# Patient Record
Sex: FEMALE | Race: WHITE | NOT HISPANIC OR LATINO | Employment: UNEMPLOYED | URBAN - METROPOLITAN AREA
[De-identification: names, ages, dates, MRNs, and addresses within clinical notes are randomized per-mention and may not be internally consistent; named-entity substitution may affect disease eponyms.]

---

## 2017-01-01 ENCOUNTER — HOSPITAL ENCOUNTER (OUTPATIENT)
Facility: HOSPITAL | Age: 77
End: 2017-06-24
Attending: EMERGENCY MEDICINE | Admitting: FAMILY MEDICINE
Payer: MEDICARE

## 2017-01-01 ENCOUNTER — HOSPITAL ENCOUNTER (INPATIENT)
Facility: HOSPITAL | Age: 77
LOS: 6 days | DRG: 189 | End: 2017-08-09
Attending: EMERGENCY MEDICINE | Admitting: INTERNAL MEDICINE
Payer: MEDICARE

## 2017-01-01 ENCOUNTER — HOSPITAL ENCOUNTER (INPATIENT)
Facility: HOSPITAL | Age: 77
LOS: 6 days | Discharge: HOSPICE | DRG: 189 | End: 2017-08-01
Attending: EMERGENCY MEDICINE | Admitting: INTERNAL MEDICINE
Payer: MEDICARE

## 2017-01-01 ENCOUNTER — HOSPITAL ENCOUNTER (EMERGENCY)
Facility: HOSPITAL | Age: 77
Discharge: PSYCHIATRIC OR SUBSTANCE USE FACILITY | End: 2017-05-29
Attending: EMERGENCY MEDICINE
Payer: MEDICARE

## 2017-01-01 VITALS
SYSTOLIC BLOOD PRESSURE: 151 MMHG | TEMPERATURE: 99 F | HEIGHT: 67 IN | DIASTOLIC BLOOD PRESSURE: 67 MMHG | WEIGHT: 170 LBS | RESPIRATION RATE: 18 BRPM | BODY MASS INDEX: 26.68 KG/M2 | OXYGEN SATURATION: 96 % | HEART RATE: 90 BPM

## 2017-01-01 VITALS
TEMPERATURE: 98 F | HEART RATE: 99 BPM | BODY MASS INDEX: 28.19 KG/M2 | HEIGHT: 64 IN | RESPIRATION RATE: 20 BRPM | DIASTOLIC BLOOD PRESSURE: 65 MMHG | OXYGEN SATURATION: 95 % | WEIGHT: 165.13 LBS | SYSTOLIC BLOOD PRESSURE: 147 MMHG

## 2017-01-01 VITALS
HEART RATE: 74 BPM | DIASTOLIC BLOOD PRESSURE: 60 MMHG | RESPIRATION RATE: 17 BRPM | OXYGEN SATURATION: 98 % | TEMPERATURE: 98 F | SYSTOLIC BLOOD PRESSURE: 127 MMHG

## 2017-01-01 VITALS
WEIGHT: 145.31 LBS | HEART RATE: 45 BPM | DIASTOLIC BLOOD PRESSURE: 22 MMHG | TEMPERATURE: 94 F | OXYGEN SATURATION: 65 % | HEIGHT: 64 IN | RESPIRATION RATE: 10 BRPM | BODY MASS INDEX: 24.81 KG/M2 | SYSTOLIC BLOOD PRESSURE: 56 MMHG

## 2017-01-01 DIAGNOSIS — F05 ACUTE CONFUSIONAL STATE: ICD-10-CM

## 2017-01-01 DIAGNOSIS — J96.02 ACUTE RESPIRATORY FAILURE WITH HYPOXIA AND HYPERCARBIA: ICD-10-CM

## 2017-01-01 DIAGNOSIS — I50.9 ACUTE ON CHRONIC CONGESTIVE HEART FAILURE, UNSPECIFIED CONGESTIVE HEART FAILURE TYPE: ICD-10-CM

## 2017-01-01 DIAGNOSIS — D61.818 PANCYTOPENIA: Primary | ICD-10-CM

## 2017-01-01 DIAGNOSIS — E03.9 HYPOTHYROIDISM, UNSPECIFIED TYPE: ICD-10-CM

## 2017-01-01 DIAGNOSIS — I50.1 PULMONARY EDEMA CARDIAC CAUSE: ICD-10-CM

## 2017-01-01 DIAGNOSIS — D64.9 SYMPTOMATIC ANEMIA: ICD-10-CM

## 2017-01-01 DIAGNOSIS — I50.31 ACUTE DIASTOLIC HEART FAILURE: ICD-10-CM

## 2017-01-01 DIAGNOSIS — J18.9 PNEUMONIA OF BOTH LUNGS DUE TO INFECTIOUS ORGANISM, UNSPECIFIED PART OF LUNG: Primary | ICD-10-CM

## 2017-01-01 DIAGNOSIS — J95.84 TRALI (TRANSFUSION RELATED ACUTE LUNG INJURY): ICD-10-CM

## 2017-01-01 DIAGNOSIS — R06.02 SOB (SHORTNESS OF BREATH): ICD-10-CM

## 2017-01-01 DIAGNOSIS — I50.9 CHF (CONGESTIVE HEART FAILURE): ICD-10-CM

## 2017-01-01 DIAGNOSIS — F03.90 DEMENTIA WITHOUT BEHAVIORAL DISTURBANCE, UNSPECIFIED DEMENTIA TYPE: ICD-10-CM

## 2017-01-01 DIAGNOSIS — N39.0 URINARY TRACT INFECTION WITHOUT HEMATURIA, SITE UNSPECIFIED: ICD-10-CM

## 2017-01-01 DIAGNOSIS — R41.0 CONFUSION: ICD-10-CM

## 2017-01-01 DIAGNOSIS — R45.1 AGITATION: Primary | ICD-10-CM

## 2017-01-01 DIAGNOSIS — J96.01 ACUTE RESPIRATORY FAILURE WITH HYPOXIA AND HYPERCARBIA: ICD-10-CM

## 2017-01-01 DIAGNOSIS — R50.9 FEVER: ICD-10-CM

## 2017-01-01 DIAGNOSIS — J81.0 ACUTE PULMONARY EDEMA: ICD-10-CM

## 2017-01-01 DIAGNOSIS — J69.0 ASPIRATION PNEUMONIA OF BOTH LOWER LOBES, UNSPECIFIED ASPIRATION PNEUMONIA TYPE: ICD-10-CM

## 2017-01-01 DIAGNOSIS — R09.02 HYPOXIA: Primary | ICD-10-CM

## 2017-01-01 LAB
ABO + RH BLD: NORMAL
ALBUMIN SERPL BCP-MCNC: 2 G/DL
ALBUMIN SERPL BCP-MCNC: 2 G/DL
ALBUMIN SERPL BCP-MCNC: 2.1 G/DL
ALBUMIN SERPL BCP-MCNC: 2.1 G/DL
ALBUMIN SERPL BCP-MCNC: 2.2 G/DL
ALBUMIN SERPL BCP-MCNC: 2.4 G/DL
ALBUMIN SERPL BCP-MCNC: 2.8 G/DL
ALLENS TEST: ABNORMAL
ALP SERPL-CCNC: 107 U/L
ALP SERPL-CCNC: 108 U/L
ALP SERPL-CCNC: 124 U/L
ALP SERPL-CCNC: 124 U/L
ALP SERPL-CCNC: 94 U/L
ALP SERPL-CCNC: 98 U/L
ALP SERPL-CCNC: 98 U/L
ALT SERPL W/O P-5'-P-CCNC: 18 U/L
ALT SERPL W/O P-5'-P-CCNC: 20 U/L
ALT SERPL W/O P-5'-P-CCNC: 21 U/L
ALT SERPL W/O P-5'-P-CCNC: 25 U/L
ALT SERPL W/O P-5'-P-CCNC: 26 U/L
ALT SERPL W/O P-5'-P-CCNC: 28 U/L
ALT SERPL W/O P-5'-P-CCNC: 34 U/L
AMPHET+METHAMPHET UR QL: NEGATIVE
ANION GAP SERPL CALC-SCNC: 10 MMOL/L
ANION GAP SERPL CALC-SCNC: 11 MMOL/L
ANION GAP SERPL CALC-SCNC: 12 MMOL/L
ANION GAP SERPL CALC-SCNC: 6 MMOL/L
ANION GAP SERPL CALC-SCNC: 7 MMOL/L
ANION GAP SERPL CALC-SCNC: 9 MMOL/L
ANION GAP SERPL CALC-SCNC: 9 MMOL/L
APAP SERPL-MCNC: <3 UG/ML
AST SERPL-CCNC: 36 U/L
AST SERPL-CCNC: 39 U/L
AST SERPL-CCNC: 41 U/L
AST SERPL-CCNC: 47 U/L
AST SERPL-CCNC: 58 U/L
AST SERPL-CCNC: 60 U/L
AST SERPL-CCNC: 73 U/L
BACTERIA #/AREA URNS HPF: ABNORMAL /HPF
BACTERIA BLD CULT: NORMAL
BACTERIA BLD CULT: NORMAL
BACTERIA UR CULT: NO GROWTH
BACTERIA UR CULT: NORMAL
BARBITURATES UR QL SCN>200 NG/ML: NEGATIVE
BASOPHILS # BLD AUTO: 0 K/UL
BASOPHILS # BLD AUTO: 0.1 K/UL
BASOPHILS # BLD AUTO: 0.3 K/UL
BASOPHILS NFR BLD: 0 %
BASOPHILS NFR BLD: 0.2 %
BASOPHILS NFR BLD: 0.3 %
BASOPHILS NFR BLD: 0.6 %
BASOPHILS NFR BLD: 1 %
BASOPHILS NFR BLD: 1.9 %
BENZODIAZ UR QL SCN>200 NG/ML: NORMAL
BILIRUB SERPL-MCNC: 0.6 MG/DL
BILIRUB SERPL-MCNC: 0.8 MG/DL
BILIRUB SERPL-MCNC: 0.9 MG/DL
BILIRUB SERPL-MCNC: 1 MG/DL
BILIRUB SERPL-MCNC: 1 MG/DL
BILIRUB UR QL STRIP: NEGATIVE
BLD GP AB SCN CELLS X3 SERPL QL: NORMAL
BLD PROD TYP BPU: NORMAL
BLOOD UNIT EXPIRATION DATE: NORMAL
BLOOD UNIT TYPE CODE: 9500
BLOOD UNIT TYPE: NORMAL
BNP SERPL-MCNC: 1039 PG/ML
BNP SERPL-MCNC: 1275 PG/ML
BNP SERPL-MCNC: 1782 PG/ML
BUN SERPL-MCNC: 14 MG/DL
BUN SERPL-MCNC: 14 MG/DL
BUN SERPL-MCNC: 15 MG/DL
BUN SERPL-MCNC: 17 MG/DL
BUN SERPL-MCNC: 18 MG/DL
BUN SERPL-MCNC: 18 MG/DL
BUN SERPL-MCNC: 20 MG/DL
BUN SERPL-MCNC: 22 MG/DL
BUN SERPL-MCNC: 23 MG/DL
BUN SERPL-MCNC: 23 MG/DL
BUN SERPL-MCNC: 24 MG/DL
BZE UR QL SCN: NEGATIVE
CALCIUM SERPL-MCNC: 8.1 MG/DL
CALCIUM SERPL-MCNC: 8.5 MG/DL
CALCIUM SERPL-MCNC: 8.5 MG/DL
CALCIUM SERPL-MCNC: 8.6 MG/DL
CALCIUM SERPL-MCNC: 8.8 MG/DL
CALCIUM SERPL-MCNC: 8.9 MG/DL
CALCIUM SERPL-MCNC: 9 MG/DL
CALCIUM SERPL-MCNC: 9 MG/DL
CALCIUM SERPL-MCNC: 9.2 MG/DL
CALCIUM SERPL-MCNC: 9.3 MG/DL
CALCIUM SERPL-MCNC: 9.3 MG/DL
CALCIUM SERPL-MCNC: 9.4 MG/DL
CANNABINOIDS UR QL SCN: NEGATIVE
CHLORIDE SERPL-SCNC: 103 MMOL/L
CHLORIDE SERPL-SCNC: 105 MMOL/L
CHLORIDE SERPL-SCNC: 107 MMOL/L
CHLORIDE SERPL-SCNC: 109 MMOL/L
CHLORIDE SERPL-SCNC: 90 MMOL/L
CHLORIDE SERPL-SCNC: 92 MMOL/L
CHLORIDE SERPL-SCNC: 93 MMOL/L
CHLORIDE SERPL-SCNC: 95 MMOL/L
CHLORIDE SERPL-SCNC: 98 MMOL/L
CHLORIDE SERPL-SCNC: 99 MMOL/L
CK MB SERPL-MCNC: 1.3 NG/ML
CK MB SERPL-MCNC: 1.9 NG/ML
CK MB SERPL-MCNC: 2.5 NG/ML
CK MB SERPL-RTO: 2.7 %
CK MB SERPL-RTO: 3 %
CK MB SERPL-RTO: 3.1 %
CK SERPL-CCNC: 44 U/L
CK SERPL-CCNC: 61 U/L
CK SERPL-CCNC: 93 U/L
CLARITY UR: CLEAR
CO2 SERPL-SCNC: 24 MMOL/L
CO2 SERPL-SCNC: 27 MMOL/L
CO2 SERPL-SCNC: 31 MMOL/L
CO2 SERPL-SCNC: 31 MMOL/L
CO2 SERPL-SCNC: 34 MMOL/L
CO2 SERPL-SCNC: 36 MMOL/L
CO2 SERPL-SCNC: 37 MMOL/L
CO2 SERPL-SCNC: 39 MMOL/L
CO2 SERPL-SCNC: 39 MMOL/L
CO2 SERPL-SCNC: 40 MMOL/L
CO2 SERPL-SCNC: 40 MMOL/L
CO2 SERPL-SCNC: 41 MMOL/L
CODING SYSTEM: NORMAL
COLOR UR: YELLOW
CREAT SERPL-MCNC: 0.8 MG/DL
CREAT SERPL-MCNC: 0.9 MG/DL
CREAT SERPL-MCNC: 1 MG/DL
CREAT SERPL-MCNC: 1.1 MG/DL
CREAT UR-MCNC: 211.1 MG/DL
DIASTOLIC DYSFUNCTION: NO
DIFFERENTIAL METHOD: ABNORMAL
DISPENSE STATUS: NORMAL
EOSINOPHIL # BLD AUTO: 0 K/UL
EOSINOPHIL # BLD AUTO: 0.1 K/UL
EOSINOPHIL # BLD AUTO: 0.2 K/UL
EOSINOPHIL # BLD AUTO: 0.2 K/UL
EOSINOPHIL NFR BLD: 0 %
EOSINOPHIL NFR BLD: 0.1 %
EOSINOPHIL NFR BLD: 0.1 %
EOSINOPHIL NFR BLD: 0.8 %
EOSINOPHIL NFR BLD: 1 %
EOSINOPHIL NFR BLD: 1 %
EOSINOPHIL NFR BLD: 1.5 %
EOSINOPHIL NFR BLD: 1.7 %
EOSINOPHIL NFR BLD: 3 %
EOSINOPHIL NFR BLD: 3.6 %
EOSINOPHIL NFR BLD: 4 %
ERYTHROCYTE [DISTWIDTH] IN BLOOD BY AUTOMATED COUNT: 16.5 %
ERYTHROCYTE [DISTWIDTH] IN BLOOD BY AUTOMATED COUNT: 17.8 %
ERYTHROCYTE [DISTWIDTH] IN BLOOD BY AUTOMATED COUNT: 18 %
ERYTHROCYTE [DISTWIDTH] IN BLOOD BY AUTOMATED COUNT: 18 %
ERYTHROCYTE [DISTWIDTH] IN BLOOD BY AUTOMATED COUNT: 18.2 %
ERYTHROCYTE [DISTWIDTH] IN BLOOD BY AUTOMATED COUNT: 18.3 %
ERYTHROCYTE [DISTWIDTH] IN BLOOD BY AUTOMATED COUNT: 18.5 %
ERYTHROCYTE [DISTWIDTH] IN BLOOD BY AUTOMATED COUNT: 18.6 %
ERYTHROCYTE [DISTWIDTH] IN BLOOD BY AUTOMATED COUNT: 19 %
ERYTHROCYTE [DISTWIDTH] IN BLOOD BY AUTOMATED COUNT: 19 %
ERYTHROCYTE [DISTWIDTH] IN BLOOD BY AUTOMATED COUNT: 19.6 %
ERYTHROCYTE [DISTWIDTH] IN BLOOD BY AUTOMATED COUNT: 19.9 %
ERYTHROCYTE [DISTWIDTH] IN BLOOD BY AUTOMATED COUNT: 19.9 %
ERYTHROCYTE [DISTWIDTH] IN BLOOD BY AUTOMATED COUNT: 21.4 %
EST. GFR  (AFRICAN AMERICAN): 56 ML/MIN/1.73 M^2
EST. GFR  (AFRICAN AMERICAN): >60 ML/MIN/1.73 M^2
EST. GFR  (NON AFRICAN AMERICAN): 49 ML/MIN/1.73 M^2
EST. GFR  (NON AFRICAN AMERICAN): 55 ML/MIN/1.73 M^2
EST. GFR  (NON AFRICAN AMERICAN): >60 ML/MIN/1.73 M^2
ESTIMATED AVG GLUCOSE: 103 MG/DL
ESTIMATED PA SYSTOLIC PRESSURE: 25.6
ETHANOL SERPL-MCNC: <10 MG/DL
FOLATE SERPL-MCNC: 11.9 NG/ML
GIANT PLATELETS BLD QL SMEAR: PRESENT
GLUCOSE SERPL-MCNC: 101 MG/DL
GLUCOSE SERPL-MCNC: 106 MG/DL
GLUCOSE SERPL-MCNC: 107 MG/DL
GLUCOSE SERPL-MCNC: 110 MG/DL
GLUCOSE SERPL-MCNC: 111 MG/DL
GLUCOSE SERPL-MCNC: 121 MG/DL
GLUCOSE SERPL-MCNC: 123 MG/DL
GLUCOSE SERPL-MCNC: 125 MG/DL
GLUCOSE SERPL-MCNC: 126 MG/DL
GLUCOSE SERPL-MCNC: 128 MG/DL
GLUCOSE SERPL-MCNC: 129 MG/DL
GLUCOSE SERPL-MCNC: 158 MG/DL
GLUCOSE SERPL-MCNC: 176 MG/DL
GLUCOSE SERPL-MCNC: 91 MG/DL
GLUCOSE UR QL STRIP: NEGATIVE
HAPTOGLOB SERPL-MCNC: 51 MG/DL
HBA1C MFR BLD HPLC: 5.2 %
HCO3 UR-SCNC: 34.6 MMOL/L (ref 24–28)
HCT VFR BLD AUTO: 19.8 %
HCT VFR BLD AUTO: 23.4 %
HCT VFR BLD AUTO: 25 %
HCT VFR BLD AUTO: 27.2 %
HCT VFR BLD AUTO: 27.9 %
HCT VFR BLD AUTO: 32 %
HCT VFR BLD AUTO: 32 %
HCT VFR BLD AUTO: 33 %
HCT VFR BLD AUTO: 33.8 %
HCT VFR BLD AUTO: 34.1 %
HCT VFR BLD AUTO: 34.2 %
HCT VFR BLD AUTO: 34.8 %
HCT VFR BLD AUTO: 36.5 %
HCT VFR BLD AUTO: 38.3 %
HGB BLD-MCNC: 10.6 G/DL
HGB BLD-MCNC: 10.6 G/DL
HGB BLD-MCNC: 10.7 G/DL
HGB BLD-MCNC: 10.7 G/DL
HGB BLD-MCNC: 11 G/DL
HGB BLD-MCNC: 11 G/DL
HGB BLD-MCNC: 11.1 G/DL
HGB BLD-MCNC: 11.6 G/DL
HGB BLD-MCNC: 12.3 G/DL
HGB BLD-MCNC: 6.3 G/DL
HGB BLD-MCNC: 7.9 G/DL
HGB BLD-MCNC: 8.3 G/DL
HGB BLD-MCNC: 8.9 G/DL
HGB BLD-MCNC: 9.1 G/DL
HGB UR QL STRIP: ABNORMAL
HGB UR QL STRIP: ABNORMAL
HGB UR QL STRIP: NEGATIVE
HGB UR QL STRIP: NEGATIVE
HYALINE CASTS #/AREA URNS LPF: 0 /LPF
HYALINE CASTS #/AREA URNS LPF: 2 /LPF
HYALINE CASTS #/AREA URNS LPF: 2 /LPF
IRON SERPL-MCNC: 26 UG/DL
KETONES UR QL STRIP: ABNORMAL
KETONES UR QL STRIP: ABNORMAL
KETONES UR QL STRIP: NEGATIVE
KETONES UR QL STRIP: NEGATIVE
LACTATE SERPL-SCNC: 1.4 MMOL/L
LDH SERPL L TO P-CCNC: 217 U/L
LEUKOCYTE ESTERASE UR QL STRIP: ABNORMAL
LEUKOCYTE ESTERASE UR QL STRIP: NEGATIVE
LYMPHOCYTES # BLD AUTO: 0.4 K/UL
LYMPHOCYTES # BLD AUTO: 0.8 K/UL
LYMPHOCYTES # BLD AUTO: 0.8 K/UL
LYMPHOCYTES # BLD AUTO: 0.9 K/UL
LYMPHOCYTES # BLD AUTO: 1 K/UL
LYMPHOCYTES # BLD AUTO: 1 K/UL
LYMPHOCYTES # BLD AUTO: 1.3 K/UL
LYMPHOCYTES # BLD AUTO: 1.3 K/UL
LYMPHOCYTES # BLD AUTO: 1.6 K/UL
LYMPHOCYTES NFR BLD: 13.6 %
LYMPHOCYTES NFR BLD: 13.8 %
LYMPHOCYTES NFR BLD: 2 %
LYMPHOCYTES NFR BLD: 2 %
LYMPHOCYTES NFR BLD: 30.4 %
LYMPHOCYTES NFR BLD: 30.9 %
LYMPHOCYTES NFR BLD: 32.1 %
LYMPHOCYTES NFR BLD: 33.3 %
LYMPHOCYTES NFR BLD: 5.3 %
LYMPHOCYTES NFR BLD: 6 %
LYMPHOCYTES NFR BLD: 6.8 %
LYMPHOCYTES NFR BLD: 8.2 %
LYMPHOCYTES NFR BLD: 8.7 %
LYMPHOCYTES NFR BLD: 9.6 %
MCH RBC QN AUTO: 28.7 PG
MCH RBC QN AUTO: 28.7 PG
MCH RBC QN AUTO: 28.8 PG
MCH RBC QN AUTO: 28.8 PG
MCH RBC QN AUTO: 29.2 PG
MCH RBC QN AUTO: 29.4 PG
MCH RBC QN AUTO: 29.5 PG
MCH RBC QN AUTO: 29.6 PG
MCH RBC QN AUTO: 29.8 PG
MCH RBC QN AUTO: 30.1 PG
MCH RBC QN AUTO: 30.1 PG
MCH RBC QN AUTO: 30.3 PG
MCHC RBC AUTO-ENTMCNC: 31.4 G/DL
MCHC RBC AUTO-ENTMCNC: 31.8 G/DL
MCHC RBC AUTO-ENTMCNC: 31.8 G/DL
MCHC RBC AUTO-ENTMCNC: 31.9 G/DL
MCHC RBC AUTO-ENTMCNC: 32.1 G/DL
MCHC RBC AUTO-ENTMCNC: 32.1 G/DL
MCHC RBC AUTO-ENTMCNC: 32.4 G/DL
MCHC RBC AUTO-ENTMCNC: 32.6 G/DL
MCHC RBC AUTO-ENTMCNC: 32.7 %
MCHC RBC AUTO-ENTMCNC: 32.7 %
MCHC RBC AUTO-ENTMCNC: 33 %
MCHC RBC AUTO-ENTMCNC: 33.2 %
MCHC RBC AUTO-ENTMCNC: 33.5 G/DL
MCHC RBC AUTO-ENTMCNC: 34 G/DL
MCV RBC AUTO: 88 FL
MCV RBC AUTO: 88 FL
MCV RBC AUTO: 89 FL
MCV RBC AUTO: 90 FL
MCV RBC AUTO: 91 FL
MCV RBC AUTO: 92 FL
MCV RBC AUTO: 93 FL
MCV RBC AUTO: 93 FL
METHADONE UR QL SCN>300 NG/ML: NEGATIVE
MICROSCOPIC COMMENT: ABNORMAL
MONOCYTES # BLD AUTO: 0.1 K/UL
MONOCYTES # BLD AUTO: 0.5 K/UL
MONOCYTES # BLD AUTO: 0.5 K/UL
MONOCYTES # BLD AUTO: 0.6 K/UL
MONOCYTES # BLD AUTO: 0.7 K/UL
MONOCYTES # BLD AUTO: 0.8 K/UL
MONOCYTES # BLD AUTO: 0.9 K/UL
MONOCYTES # BLD AUTO: 1 K/UL
MONOCYTES NFR BLD: 1.2 %
MONOCYTES NFR BLD: 11 %
MONOCYTES NFR BLD: 12.7 %
MONOCYTES NFR BLD: 17 %
MONOCYTES NFR BLD: 17.4 %
MONOCYTES NFR BLD: 17.4 %
MONOCYTES NFR BLD: 3 %
MONOCYTES NFR BLD: 4 %
MONOCYTES NFR BLD: 5.3 %
MONOCYTES NFR BLD: 6.9 %
MONOCYTES NFR BLD: 7 %
MONOCYTES NFR BLD: 7.3 %
MONOCYTES NFR BLD: 8.9 %
MONOCYTES NFR BLD: 9.2 %
NEUTROPHILS # BLD AUTO: 1.5 K/UL
NEUTROPHILS # BLD AUTO: 1.6 K/UL
NEUTROPHILS # BLD AUTO: 1.8 K/UL
NEUTROPHILS # BLD AUTO: 11.7 K/UL
NEUTROPHILS # BLD AUTO: 2.7 K/UL
NEUTROPHILS # BLD AUTO: 5 K/UL
NEUTROPHILS # BLD AUTO: 6.6 K/UL
NEUTROPHILS # BLD AUTO: 7 K/UL
NEUTROPHILS # BLD AUTO: 7.1 K/UL
NEUTROPHILS # BLD AUTO: 7.8 K/UL
NEUTROPHILS # BLD AUTO: 8.9 K/UL
NEUTROPHILS NFR BLD: 45 %
NEUTROPHILS NFR BLD: 48.2 %
NEUTROPHILS NFR BLD: 49 %
NEUTROPHILS NFR BLD: 49.3 %
NEUTROPHILS NFR BLD: 52.2 %
NEUTROPHILS NFR BLD: 68 %
NEUTROPHILS NFR BLD: 73.6 %
NEUTROPHILS NFR BLD: 75.4 %
NEUTROPHILS NFR BLD: 80.3 %
NEUTROPHILS NFR BLD: 83.9 %
NEUTROPHILS NFR BLD: 84.3 %
NEUTROPHILS NFR BLD: 84.7 %
NEUTROPHILS NFR BLD: 91 %
NEUTROPHILS NFR BLD: 93.2 %
NEUTS BAND NFR BLD MANUAL: 27 %
NEUTS BAND NFR BLD MANUAL: 3 %
NEUTS BAND NFR BLD MANUAL: 38 %
NITRITE UR QL STRIP: NEGATIVE
NITRITE UR QL STRIP: POSITIVE
NON-SQ EPI CELLS #/AREA URNS HPF: 5 /HPF
NUM UNITS TRANS PACKED RBC: NORMAL
OPIATES UR QL SCN: NEGATIVE
PCO2 BLDA: 61.7 MMHG (ref 35–45)
PCP UR QL SCN>25 NG/ML: NEGATIVE
PH SMN: 7.36 [PH] (ref 7.35–7.45)
PH UR STRIP: 5 [PH] (ref 5–8)
PH UR STRIP: 6 [PH] (ref 5–8)
PLATELET # BLD AUTO: 102 K/UL
PLATELET # BLD AUTO: 131 K/UL
PLATELET # BLD AUTO: 159 K/UL
PLATELET # BLD AUTO: 162 K/UL
PLATELET # BLD AUTO: 168 K/UL
PLATELET # BLD AUTO: 169 K/UL
PLATELET # BLD AUTO: 196 K/UL
PLATELET # BLD AUTO: 217 K/UL
PLATELET # BLD AUTO: 265 K/UL
PLATELET # BLD AUTO: 290 K/UL
PLATELET # BLD AUTO: 294 K/UL
PLATELET # BLD AUTO: 445 K/UL
PLATELET # BLD AUTO: 89 K/UL
PLATELET # BLD AUTO: 90 K/UL
PLATELET BLD QL SMEAR: ABNORMAL
PMV BLD AUTO: 10 FL
PMV BLD AUTO: 10.1 FL
PMV BLD AUTO: 11.4 FL
PMV BLD AUTO: 11.5 FL
PMV BLD AUTO: 8.7 FL
PMV BLD AUTO: 8.8 FL
PMV BLD AUTO: 9 FL
PMV BLD AUTO: 9.3 FL
PMV BLD AUTO: 9.4 FL
PMV BLD AUTO: 9.7 FL
PO2 BLDA: 34 MMHG (ref 40–60)
POC BE: 9 MMOL/L
POC SATURATED O2: 61 % (ref 95–100)
POC TCO2: 36 MMOL/L (ref 24–29)
POCT GLUCOSE: 107 MG/DL (ref 70–110)
POCT GLUCOSE: 114 MG/DL (ref 70–110)
POCT GLUCOSE: 118 MG/DL (ref 70–110)
POCT GLUCOSE: 118 MG/DL (ref 70–110)
POCT GLUCOSE: 127 MG/DL (ref 70–110)
POCT GLUCOSE: 127 MG/DL (ref 70–110)
POCT GLUCOSE: 129 MG/DL (ref 70–110)
POCT GLUCOSE: 135 MG/DL (ref 70–110)
POCT GLUCOSE: 139 MG/DL (ref 70–110)
POCT GLUCOSE: 143 MG/DL (ref 70–110)
POCT GLUCOSE: 145 MG/DL (ref 70–110)
POCT GLUCOSE: 146 MG/DL (ref 70–110)
POCT GLUCOSE: 158 MG/DL (ref 70–110)
POTASSIUM SERPL-SCNC: 3.2 MMOL/L
POTASSIUM SERPL-SCNC: 3.2 MMOL/L
POTASSIUM SERPL-SCNC: 3.3 MMOL/L
POTASSIUM SERPL-SCNC: 3.4 MMOL/L
POTASSIUM SERPL-SCNC: 3.4 MMOL/L
POTASSIUM SERPL-SCNC: 3.5 MMOL/L
POTASSIUM SERPL-SCNC: 3.6 MMOL/L
POTASSIUM SERPL-SCNC: 3.7 MMOL/L
POTASSIUM SERPL-SCNC: 3.8 MMOL/L
POTASSIUM SERPL-SCNC: 4 MMOL/L
POTASSIUM SERPL-SCNC: 4 MMOL/L
PROCALCITONIN SERPL IA-MCNC: 0.09 NG/ML
PROCALCITONIN SERPL IA-MCNC: 0.12 NG/ML
PROT SERPL-MCNC: 6.2 G/DL
PROT SERPL-MCNC: 6.8 G/DL
PROT SERPL-MCNC: 7.2 G/DL
PROT SERPL-MCNC: 7.3 G/DL
PROT SERPL-MCNC: 7.3 G/DL
PROT SERPL-MCNC: 7.5 G/DL
PROT SERPL-MCNC: 7.7 G/DL
PROT UR QL STRIP: ABNORMAL
PROT UR QL STRIP: NEGATIVE
RBC # BLD AUTO: 2.13 M/UL
RBC # BLD AUTO: 2.67 M/UL
RBC # BLD AUTO: 2.76 M/UL
RBC # BLD AUTO: 2.93 M/UL
RBC # BLD AUTO: 3.03 M/UL
RBC # BLD AUTO: 3.6 M/UL
RBC # BLD AUTO: 3.65 M/UL
RBC # BLD AUTO: 3.67 M/UL
RBC # BLD AUTO: 3.74 M/UL
RBC # BLD AUTO: 3.77 M/UL
RBC # BLD AUTO: 3.79 M/UL
RBC # BLD AUTO: 3.85 M/UL
RBC # BLD AUTO: 4.06 M/UL
RBC # BLD AUTO: 4.2 M/UL
RBC #/AREA URNS HPF: 0 /HPF (ref 0–4)
RBC #/AREA URNS HPF: 2 /HPF (ref 0–4)
RBC #/AREA URNS HPF: 40 /HPF (ref 0–4)
RETICS/RBC NFR AUTO: 1.6 %
RETIRED EF AND QEF - SEE NOTES: 70 (ref 55–65)
SAMPLE: ABNORMAL
SITE: ABNORMAL
SODIUM SERPL-SCNC: 138 MMOL/L
SODIUM SERPL-SCNC: 139 MMOL/L
SODIUM SERPL-SCNC: 139 MMOL/L
SODIUM SERPL-SCNC: 140 MMOL/L
SODIUM SERPL-SCNC: 140 MMOL/L
SODIUM SERPL-SCNC: 141 MMOL/L
SODIUM SERPL-SCNC: 142 MMOL/L
SODIUM SERPL-SCNC: 142 MMOL/L
SODIUM SERPL-SCNC: 143 MMOL/L
SP GR UR STRIP: 1.01 (ref 1–1.03)
SP GR UR STRIP: 1.02 (ref 1–1.03)
SP GR UR STRIP: >=1.03 (ref 1–1.03)
SP GR UR STRIP: >=1.03 (ref 1–1.03)
SQUAMOUS #/AREA URNS HPF: 2 /HPF
SQUAMOUS #/AREA URNS HPF: 2 /HPF
SQUAMOUS #/AREA URNS HPF: 30 /HPF
TOXICOLOGY INFORMATION: NORMAL
TRICUSPID VALVE REGURGITATION: NORMAL
TROPONIN I SERPL DL<=0.01 NG/ML-MCNC: 0.01 NG/ML
TROPONIN I SERPL DL<=0.01 NG/ML-MCNC: 0.08 NG/ML
TROPONIN I SERPL DL<=0.01 NG/ML-MCNC: 0.21 NG/ML
TSH SERPL DL<=0.005 MIU/L-ACNC: 1.92 UIU/ML
TSH SERPL DL<=0.005 MIU/L-ACNC: 2.57 UIU/ML
URN SPEC COLLECT METH UR: ABNORMAL
UROBILINOGEN UR STRIP-ACNC: 1 EU/DL
UROBILINOGEN UR STRIP-ACNC: NEGATIVE EU/DL
VANCOMYCIN TROUGH SERPL-MCNC: 10 UG/ML
VIT B12 SERPL-MCNC: 376 PG/ML
WBC # BLD AUTO: 10.6 K/UL
WBC # BLD AUTO: 13.9 K/UL
WBC # BLD AUTO: 15.8 K/UL
WBC # BLD AUTO: 22.3 K/UL
WBC # BLD AUTO: 22.8 K/UL
WBC # BLD AUTO: 3.2 K/UL
WBC # BLD AUTO: 3.3 K/UL
WBC # BLD AUTO: 4 K/UL
WBC # BLD AUTO: 5.1 K/UL
WBC # BLD AUTO: 6.7 K/UL
WBC # BLD AUTO: 7.7 K/UL
WBC # BLD AUTO: 8.2 K/UL
WBC # BLD AUTO: 9.2 K/UL
WBC # BLD AUTO: 9.4 K/UL
WBC #/AREA URNS HPF: 2 /HPF (ref 0–5)
WBC #/AREA URNS HPF: 30 /HPF (ref 0–5)
WBC #/AREA URNS HPF: 4 /HPF (ref 0–5)

## 2017-01-01 PROCEDURE — 25000242 PHARM REV CODE 250 ALT 637 W/ HCPCS: Performed by: EMERGENCY MEDICINE

## 2017-01-01 PROCEDURE — 87086 URINE CULTURE/COLONY COUNT: CPT

## 2017-01-01 PROCEDURE — 25000003 PHARM REV CODE 250: Performed by: EMERGENCY MEDICINE

## 2017-01-01 PROCEDURE — 20000000 HC ICU ROOM

## 2017-01-01 PROCEDURE — 85045 AUTOMATED RETICULOCYTE COUNT: CPT

## 2017-01-01 PROCEDURE — 25000003 PHARM REV CODE 250: Performed by: INTERNAL MEDICINE

## 2017-01-01 PROCEDURE — 82962 GLUCOSE BLOOD TEST: CPT

## 2017-01-01 PROCEDURE — 94761 N-INVAS EAR/PLS OXIMETRY MLT: CPT

## 2017-01-01 PROCEDURE — 27000221 HC OXYGEN, UP TO 24 HOURS

## 2017-01-01 PROCEDURE — 63600175 PHARM REV CODE 636 W HCPCS: Performed by: INTERNAL MEDICINE

## 2017-01-01 PROCEDURE — G8996 SWALLOW CURRENT STATUS: HCPCS | Mod: CI

## 2017-01-01 PROCEDURE — 96374 THER/PROPH/DIAG INJ IV PUSH: CPT | Mod: 59

## 2017-01-01 PROCEDURE — 85025 COMPLETE CBC W/AUTO DIFF WBC: CPT

## 2017-01-01 PROCEDURE — 83880 ASSAY OF NATRIURETIC PEPTIDE: CPT

## 2017-01-01 PROCEDURE — 80329 ANALGESICS NON-OPIOID 1 OR 2: CPT

## 2017-01-01 PROCEDURE — 63600175 PHARM REV CODE 636 W HCPCS: Performed by: SPECIALIST

## 2017-01-01 PROCEDURE — 94660 CPAP INITIATION&MGMT: CPT

## 2017-01-01 PROCEDURE — 82553 CREATINE MB FRACTION: CPT

## 2017-01-01 PROCEDURE — 82607 VITAMIN B-12: CPT

## 2017-01-01 PROCEDURE — 93005 ELECTROCARDIOGRAM TRACING: CPT

## 2017-01-01 PROCEDURE — 83540 ASSAY OF IRON: CPT

## 2017-01-01 PROCEDURE — 99900035 HC TECH TIME PER 15 MIN (STAT)

## 2017-01-01 PROCEDURE — 80053 COMPREHEN METABOLIC PANEL: CPT

## 2017-01-01 PROCEDURE — 36415 COLL VENOUS BLD VENIPUNCTURE: CPT

## 2017-01-01 PROCEDURE — 84145 PROCALCITONIN (PCT): CPT

## 2017-01-01 PROCEDURE — A4216 STERILE WATER/SALINE, 10 ML: HCPCS | Performed by: EMERGENCY MEDICINE

## 2017-01-01 PROCEDURE — 84484 ASSAY OF TROPONIN QUANT: CPT

## 2017-01-01 PROCEDURE — 94640 AIRWAY INHALATION TREATMENT: CPT

## 2017-01-01 PROCEDURE — 63600175 PHARM REV CODE 636 W HCPCS: Performed by: EMERGENCY MEDICINE

## 2017-01-01 PROCEDURE — 63600175 PHARM REV CODE 636 W HCPCS: Performed by: NURSE PRACTITIONER

## 2017-01-01 PROCEDURE — P9612 CATHETERIZE FOR URINE SPEC: HCPCS

## 2017-01-01 PROCEDURE — 99233 SBSQ HOSP IP/OBS HIGH 50: CPT | Mod: ,,, | Performed by: INTERNAL MEDICINE

## 2017-01-01 PROCEDURE — 87186 SC STD MICRODIL/AGAR DIL: CPT

## 2017-01-01 PROCEDURE — 25000242 PHARM REV CODE 250 ALT 637 W/ HCPCS: Performed by: INTERNAL MEDICINE

## 2017-01-01 PROCEDURE — 25000003 PHARM REV CODE 250: Performed by: FAMILY MEDICINE

## 2017-01-01 PROCEDURE — 82553 CREATINE MB FRACTION: CPT | Mod: 91

## 2017-01-01 PROCEDURE — G0378 HOSPITAL OBSERVATION PER HR: HCPCS

## 2017-01-01 PROCEDURE — 80048 BASIC METABOLIC PNL TOTAL CA: CPT

## 2017-01-01 PROCEDURE — 27100171 HC OXYGEN HIGH FLOW UP TO 24 HOURS

## 2017-01-01 PROCEDURE — 81000 URINALYSIS NONAUTO W/SCOPE: CPT

## 2017-01-01 PROCEDURE — 86850 RBC ANTIBODY SCREEN: CPT

## 2017-01-01 PROCEDURE — 99285 EMERGENCY DEPT VISIT HI MDM: CPT

## 2017-01-01 PROCEDURE — 96366 THER/PROPH/DIAG IV INF ADDON: CPT

## 2017-01-01 PROCEDURE — 85027 COMPLETE CBC AUTOMATED: CPT

## 2017-01-01 PROCEDURE — 63600175 PHARM REV CODE 636 W HCPCS: Performed by: HOSPITALIST

## 2017-01-01 PROCEDURE — G8997 SWALLOW GOAL STATUS: HCPCS | Mod: CN

## 2017-01-01 PROCEDURE — 87077 CULTURE AEROBIC IDENTIFY: CPT

## 2017-01-01 PROCEDURE — 85007 BL SMEAR W/DIFF WBC COUNT: CPT

## 2017-01-01 PROCEDURE — 99238 HOSP IP/OBS DSCHRG MGMT 30/<: CPT | Mod: ,,, | Performed by: INTERNAL MEDICINE

## 2017-01-01 PROCEDURE — 93306 TTE W/DOPPLER COMPLETE: CPT

## 2017-01-01 PROCEDURE — 83036 HEMOGLOBIN GLYCOSYLATED A1C: CPT

## 2017-01-01 PROCEDURE — 99232 SBSQ HOSP IP/OBS MODERATE 35: CPT | Mod: ,,, | Performed by: INTERNAL MEDICINE

## 2017-01-01 PROCEDURE — 27000190 HC CPAP FULL FACE MASK W/VALVE

## 2017-01-01 PROCEDURE — 81003 URINALYSIS AUTO W/O SCOPE: CPT

## 2017-01-01 PROCEDURE — 97803 MED NUTRITION INDIV SUBSEQ: CPT

## 2017-01-01 PROCEDURE — 83010 ASSAY OF HAPTOGLOBIN QUANT: CPT

## 2017-01-01 PROCEDURE — G8997 SWALLOW GOAL STATUS: HCPCS | Mod: CL

## 2017-01-01 PROCEDURE — 87040 BLOOD CULTURE FOR BACTERIA: CPT

## 2017-01-01 PROCEDURE — 96367 TX/PROPH/DG ADDL SEQ IV INF: CPT

## 2017-01-01 PROCEDURE — 96375 TX/PRO/DX INJ NEW DRUG ADDON: CPT

## 2017-01-01 PROCEDURE — 92610 EVALUATE SWALLOWING FUNCTION: CPT

## 2017-01-01 PROCEDURE — G8996 SWALLOW CURRENT STATUS: HCPCS | Mod: CJ

## 2017-01-01 PROCEDURE — 84443 ASSAY THYROID STIM HORMONE: CPT

## 2017-01-01 PROCEDURE — 31720 CLEARANCE OF AIRWAYS: CPT

## 2017-01-01 PROCEDURE — 12000002 HC ACUTE/MED SURGE SEMI-PRIVATE ROOM

## 2017-01-01 PROCEDURE — 96365 THER/PROPH/DIAG IV INF INIT: CPT

## 2017-01-01 PROCEDURE — 97802 MEDICAL NUTRITION INDIV IN: CPT | Performed by: DIETITIAN, REGISTERED

## 2017-01-01 PROCEDURE — 99223 1ST HOSP IP/OBS HIGH 75: CPT | Mod: ,,, | Performed by: INTERNAL MEDICINE

## 2017-01-01 PROCEDURE — 99291 CRITICAL CARE FIRST HOUR: CPT

## 2017-01-01 PROCEDURE — 82803 BLOOD GASES ANY COMBINATION: CPT

## 2017-01-01 PROCEDURE — 25000003 PHARM REV CODE 250: Performed by: HOSPITALIST

## 2017-01-01 PROCEDURE — G8997 SWALLOW GOAL STATUS: HCPCS | Mod: CI

## 2017-01-01 PROCEDURE — P9016 RBC LEUKOCYTES REDUCED: HCPCS

## 2017-01-01 PROCEDURE — 80202 ASSAY OF VANCOMYCIN: CPT

## 2017-01-01 PROCEDURE — 11000001 HC ACUTE MED/SURG PRIVATE ROOM

## 2017-01-01 PROCEDURE — 93010 ELECTROCARDIOGRAM REPORT: CPT | Mod: ,,, | Performed by: INTERNAL MEDICINE

## 2017-01-01 PROCEDURE — C9113 INJ PANTOPRAZOLE SODIUM, VIA: HCPCS | Performed by: INTERNAL MEDICINE

## 2017-01-01 PROCEDURE — 99291 CRITICAL CARE FIRST HOUR: CPT | Mod: 25

## 2017-01-01 PROCEDURE — 83605 ASSAY OF LACTIC ACID: CPT

## 2017-01-01 PROCEDURE — 83615 LACTATE (LD) (LDH) ENZYME: CPT

## 2017-01-01 PROCEDURE — 86900 BLOOD TYPING SEROLOGIC ABO: CPT

## 2017-01-01 PROCEDURE — 99239 HOSP IP/OBS DSCHRG MGMT >30: CPT | Mod: ,,, | Performed by: INTERNAL MEDICINE

## 2017-01-01 PROCEDURE — 80320 DRUG SCREEN QUANTALCOHOLS: CPT

## 2017-01-01 PROCEDURE — 80307 DRUG TEST PRSMV CHEM ANLYZR: CPT

## 2017-01-01 PROCEDURE — G8998 SWALLOW D/C STATUS: HCPCS | Mod: CI

## 2017-01-01 PROCEDURE — 87088 URINE BACTERIA CULTURE: CPT

## 2017-01-01 PROCEDURE — 86920 COMPATIBILITY TEST SPIN: CPT

## 2017-01-01 PROCEDURE — 82746 ASSAY OF FOLIC ACID SERUM: CPT

## 2017-01-01 PROCEDURE — G8996 SWALLOW CURRENT STATUS: HCPCS | Mod: CN

## 2017-01-01 RX ORDER — OLANZAPINE 5 MG/1
10 TABLET ORAL 2 TIMES DAILY
Status: DISCONTINUED | OUTPATIENT
Start: 2017-01-01 | End: 2017-01-01

## 2017-01-01 RX ORDER — ACETAMINOPHEN 325 MG/1
650 TABLET ORAL EVERY 6 HOURS PRN
Status: DISCONTINUED | OUTPATIENT
Start: 2017-01-01 | End: 2017-01-01 | Stop reason: HOSPADM

## 2017-01-01 RX ORDER — FUROSEMIDE 10 MG/ML
20 INJECTION INTRAMUSCULAR; INTRAVENOUS ONCE
Status: COMPLETED | OUTPATIENT
Start: 2017-01-01 | End: 2017-01-01

## 2017-01-01 RX ORDER — FLUCONAZOLE 2 MG/ML
100 INJECTION, SOLUTION INTRAVENOUS
Status: DISCONTINUED | OUTPATIENT
Start: 2017-01-01 | End: 2017-01-01

## 2017-01-01 RX ORDER — MORPHINE SULFATE 2 MG/ML
2 INJECTION, SOLUTION INTRAMUSCULAR; INTRAVENOUS EVERY 4 HOURS PRN
Status: CANCELLED | OUTPATIENT
Start: 2017-01-01

## 2017-01-01 RX ORDER — PANTOPRAZOLE SODIUM 40 MG/1
40 TABLET, DELAYED RELEASE ORAL DAILY
Status: DISCONTINUED | OUTPATIENT
Start: 2017-01-01 | End: 2017-01-01 | Stop reason: HOSPADM

## 2017-01-01 RX ORDER — GLUCAGON 1 MG
1 KIT INJECTION
Status: DISCONTINUED | OUTPATIENT
Start: 2017-01-01 | End: 2017-01-01 | Stop reason: ALTCHOICE

## 2017-01-01 RX ORDER — OXYBUTYNIN CHLORIDE 5 MG/1
5 TABLET, EXTENDED RELEASE ORAL DAILY
COMMUNITY

## 2017-01-01 RX ORDER — NAPROXEN SODIUM 220 MG/1
81 TABLET, FILM COATED ORAL DAILY
Status: DISCONTINUED | OUTPATIENT
Start: 2017-01-01 | End: 2017-01-01

## 2017-01-01 RX ORDER — FERROUS SULFATE 325(65) MG
325 TABLET, DELAYED RELEASE (ENTERIC COATED) ORAL 2 TIMES DAILY
Status: DISCONTINUED | OUTPATIENT
Start: 2017-01-01 | End: 2017-01-01 | Stop reason: HOSPADM

## 2017-01-01 RX ORDER — PANTOPRAZOLE SODIUM 40 MG/10ML
40 INJECTION, POWDER, LYOPHILIZED, FOR SOLUTION INTRAVENOUS DAILY
Status: DISCONTINUED | OUTPATIENT
Start: 2017-01-01 | End: 2017-01-01

## 2017-01-01 RX ORDER — ACETAMINOPHEN 500 MG
1000 TABLET ORAL EVERY 6 HOURS PRN
Status: DISCONTINUED | OUTPATIENT
Start: 2017-01-01 | End: 2017-01-01

## 2017-01-01 RX ORDER — LORAZEPAM 2 MG/ML
2 INJECTION INTRAMUSCULAR
Status: DISCONTINUED | OUTPATIENT
Start: 2017-01-01 | End: 2017-01-01 | Stop reason: HOSPADM

## 2017-01-01 RX ORDER — IPRATROPIUM BROMIDE AND ALBUTEROL SULFATE 2.5; .5 MG/3ML; MG/3ML
3 SOLUTION RESPIRATORY (INHALATION) EVERY 6 HOURS
Status: CANCELLED | OUTPATIENT
Start: 2017-01-01

## 2017-01-01 RX ORDER — ENOXAPARIN SODIUM 100 MG/ML
40 INJECTION SUBCUTANEOUS
Status: DISCONTINUED | OUTPATIENT
Start: 2017-01-01 | End: 2017-01-01

## 2017-01-01 RX ORDER — CLONAZEPAM 0.5 MG/1
0.5 TABLET ORAL NIGHTLY
COMMUNITY

## 2017-01-01 RX ORDER — FUROSEMIDE 10 MG/ML
40 INJECTION INTRAMUSCULAR; INTRAVENOUS ONCE
Status: COMPLETED | OUTPATIENT
Start: 2017-01-01 | End: 2017-01-01

## 2017-01-01 RX ORDER — ENOXAPARIN SODIUM 100 MG/ML
40 INJECTION SUBCUTANEOUS EVERY 24 HOURS
Status: DISCONTINUED | OUTPATIENT
Start: 2017-01-01 | End: 2017-01-01 | Stop reason: ALTCHOICE

## 2017-01-01 RX ORDER — DONEPEZIL HYDROCHLORIDE 5 MG/1
10 TABLET, FILM COATED ORAL NIGHTLY
Status: DISCONTINUED | OUTPATIENT
Start: 2017-01-01 | End: 2017-01-01

## 2017-01-01 RX ORDER — DONEPEZIL HYDROCHLORIDE 10 MG/1
10 TABLET, FILM COATED ORAL NIGHTLY
COMMUNITY

## 2017-01-01 RX ORDER — L. ACIDOPHILUS/L.BULGARICUS 100MM CELL
1 GRANULES IN PACKET (EA) ORAL 2 TIMES DAILY
Status: DISCONTINUED | OUTPATIENT
Start: 2017-01-01 | End: 2017-01-01 | Stop reason: HOSPADM

## 2017-01-01 RX ORDER — METOPROLOL TARTRATE 25 MG/1
25 TABLET, FILM COATED ORAL 2 TIMES DAILY
Status: DISCONTINUED | OUTPATIENT
Start: 2017-01-01 | End: 2017-01-01 | Stop reason: ALTCHOICE

## 2017-01-01 RX ORDER — HYDROCODONE BITARTRATE AND ACETAMINOPHEN 500; 5 MG/1; MG/1
TABLET ORAL
Status: DISCONTINUED | OUTPATIENT
Start: 2017-01-01 | End: 2017-01-01 | Stop reason: ALTCHOICE

## 2017-01-01 RX ORDER — LACTULOSE 10 G/15ML
20 SOLUTION ORAL 2 TIMES DAILY
Status: DISCONTINUED | OUTPATIENT
Start: 2017-01-01 | End: 2017-01-01

## 2017-01-01 RX ORDER — CIPROFLOXACIN 2 MG/ML
400 INJECTION, SOLUTION INTRAVENOUS
Status: DISCONTINUED | OUTPATIENT
Start: 2017-01-01 | End: 2017-01-01

## 2017-01-01 RX ORDER — SERTRALINE HYDROCHLORIDE 50 MG/1
50 TABLET, FILM COATED ORAL DAILY
COMMUNITY

## 2017-01-01 RX ORDER — FERROUS SULFATE 325(65) MG
325 TABLET, DELAYED RELEASE (ENTERIC COATED) ORAL 2 TIMES DAILY
Status: DISCONTINUED | OUTPATIENT
Start: 2017-01-01 | End: 2017-01-01

## 2017-01-01 RX ORDER — ACETAMINOPHEN 325 MG/1
650 TABLET ORAL EVERY 6 HOURS PRN
COMMUNITY

## 2017-01-01 RX ORDER — METHYLPREDNISOLONE SOD SUCC 125 MG
80 VIAL (EA) INJECTION EVERY 8 HOURS
Status: DISCONTINUED | OUTPATIENT
Start: 2017-01-01 | End: 2017-01-01

## 2017-01-01 RX ORDER — BIMATOPROST 0.3 MG/ML
1 SOLUTION/ DROPS OPHTHALMIC NIGHTLY
Status: DISCONTINUED | OUTPATIENT
Start: 2017-01-01 | End: 2017-01-01

## 2017-01-01 RX ORDER — MAGNESIUM HYDROXIDE 2400 MG/10ML
10 SUSPENSION ORAL WEEKLY
COMMUNITY

## 2017-01-01 RX ORDER — MORPHINE SULFATE 2 MG/ML
2 INJECTION, SOLUTION INTRAMUSCULAR; INTRAVENOUS
Status: CANCELLED | OUTPATIENT
Start: 2017-01-01

## 2017-01-01 RX ORDER — OXYBUTYNIN CHLORIDE 5 MG/1
5 TABLET, EXTENDED RELEASE ORAL DAILY
Status: DISCONTINUED | OUTPATIENT
Start: 2017-01-01 | End: 2017-01-01

## 2017-01-01 RX ORDER — LACTULOSE 10 G/15ML
20 SOLUTION ORAL 2 TIMES DAILY
Status: DISCONTINUED | OUTPATIENT
Start: 2017-01-01 | End: 2017-01-01 | Stop reason: HOSPADM

## 2017-01-01 RX ORDER — POTASSIUM CHLORIDE 7.45 MG/ML
40 INJECTION INTRAVENOUS ONCE
Status: COMPLETED | OUTPATIENT
Start: 2017-01-01 | End: 2017-01-01

## 2017-01-01 RX ORDER — LEVOTHYROXINE SODIUM ANHYDROUS 100 UG/5ML
50 INJECTION, POWDER, LYOPHILIZED, FOR SOLUTION INTRAVENOUS DAILY
Status: DISCONTINUED | OUTPATIENT
Start: 2017-01-01 | End: 2017-01-01 | Stop reason: ALTCHOICE

## 2017-01-01 RX ORDER — ACETAMINOPHEN 10 MG/ML
1000 INJECTION, SOLUTION INTRAVENOUS EVERY 8 HOURS
Status: DISPENSED | OUTPATIENT
Start: 2017-01-01 | End: 2017-01-01

## 2017-01-01 RX ORDER — ACETAMINOPHEN 10 MG/ML
1000 INJECTION, SOLUTION INTRAVENOUS EVERY 8 HOURS
Status: COMPLETED | OUTPATIENT
Start: 2017-01-01 | End: 2017-01-01

## 2017-01-01 RX ORDER — NAPROXEN SODIUM 220 MG/1
81 TABLET, FILM COATED ORAL DAILY
Status: DISCONTINUED | OUTPATIENT
Start: 2017-01-01 | End: 2017-01-01 | Stop reason: HOSPADM

## 2017-01-01 RX ORDER — IPRATROPIUM BROMIDE AND ALBUTEROL SULFATE 2.5; .5 MG/3ML; MG/3ML
3 SOLUTION RESPIRATORY (INHALATION) EVERY 6 HOURS
Status: DISCONTINUED | OUTPATIENT
Start: 2017-01-01 | End: 2017-01-01

## 2017-01-01 RX ORDER — ONDANSETRON 2 MG/ML
4 INJECTION INTRAMUSCULAR; INTRAVENOUS EVERY 6 HOURS PRN
Status: CANCELLED | OUTPATIENT
Start: 2017-01-01

## 2017-01-01 RX ORDER — LABETALOL HYDROCHLORIDE 5 MG/ML
20 INJECTION, SOLUTION INTRAVENOUS
Status: DISCONTINUED | OUTPATIENT
Start: 2017-01-01 | End: 2017-01-01 | Stop reason: HOSPADM

## 2017-01-01 RX ORDER — LEVOTHYROXINE SODIUM 50 UG/1
100 TABLET ORAL
Status: DISCONTINUED | OUTPATIENT
Start: 2017-01-01 | End: 2017-01-01

## 2017-01-01 RX ORDER — POTASSIUM CHLORIDE 7.45 MG/ML
10 INJECTION INTRAVENOUS
Status: COMPLETED | OUTPATIENT
Start: 2017-01-01 | End: 2017-01-01

## 2017-01-01 RX ORDER — CLONAZEPAM 1 MG/1
1 TABLET ORAL EVERY MORNING
Status: DISCONTINUED | OUTPATIENT
Start: 2017-01-01 | End: 2017-01-01

## 2017-01-01 RX ORDER — POTASSIUM CHLORIDE 7.45 MG/ML
10 INJECTION INTRAVENOUS ONCE
Status: COMPLETED | OUTPATIENT
Start: 2017-01-01 | End: 2017-01-01

## 2017-01-01 RX ORDER — OLANZAPINE 5 MG/1
10 TABLET ORAL DAILY
Status: DISCONTINUED | OUTPATIENT
Start: 2017-01-01 | End: 2017-01-01 | Stop reason: HOSPADM

## 2017-01-01 RX ORDER — MORPHINE SULFATE 4 MG/ML
4 INJECTION, SOLUTION INTRAMUSCULAR; INTRAVENOUS
Status: DISCONTINUED | OUTPATIENT
Start: 2017-01-01 | End: 2017-01-01 | Stop reason: HOSPADM

## 2017-01-01 RX ORDER — METOPROLOL TARTRATE 25 MG/1
25 TABLET, FILM COATED ORAL 2 TIMES DAILY
Status: DISCONTINUED | OUTPATIENT
Start: 2017-01-01 | End: 2017-01-01

## 2017-01-01 RX ORDER — POTASSIUM CHLORIDE 29.8 MG/ML
40 INJECTION INTRAVENOUS ONCE
Status: DISCONTINUED | OUTPATIENT
Start: 2017-01-01 | End: 2017-01-01

## 2017-01-01 RX ORDER — CLONAZEPAM 1 MG/1
1 TABLET ORAL DAILY PRN
Status: DISCONTINUED | OUTPATIENT
Start: 2017-01-01 | End: 2017-01-01 | Stop reason: HOSPADM

## 2017-01-01 RX ORDER — CLONAZEPAM 0.5 MG/1
0.5 TABLET ORAL NIGHTLY PRN
Status: DISCONTINUED | OUTPATIENT
Start: 2017-01-01 | End: 2017-01-01 | Stop reason: HOSPADM

## 2017-01-01 RX ORDER — SCOLOPAMINE TRANSDERMAL SYSTEM 1 MG/1
1 PATCH, EXTENDED RELEASE TRANSDERMAL
Status: DISCONTINUED | OUTPATIENT
Start: 2017-01-01 | End: 2017-01-01 | Stop reason: HOSPADM

## 2017-01-01 RX ORDER — LANOLIN ALCOHOL/MO/W.PET/CERES
400 CREAM (GRAM) TOPICAL DAILY
Status: DISCONTINUED | OUTPATIENT
Start: 2017-01-01 | End: 2017-01-01 | Stop reason: HOSPADM

## 2017-01-01 RX ORDER — IPRATROPIUM BROMIDE AND ALBUTEROL SULFATE 2.5; .5 MG/3ML; MG/3ML
3 SOLUTION RESPIRATORY (INHALATION) EVERY 6 HOURS PRN
Status: DISCONTINUED | OUTPATIENT
Start: 2017-01-01 | End: 2017-01-01 | Stop reason: HOSPADM

## 2017-01-01 RX ORDER — INSULIN ASPART 100 [IU]/ML
0-5 INJECTION, SOLUTION INTRAVENOUS; SUBCUTANEOUS EVERY 6 HOURS PRN
Status: DISCONTINUED | OUTPATIENT
Start: 2017-01-01 | End: 2017-01-01 | Stop reason: HOSPADM

## 2017-01-01 RX ORDER — CIPROFLOXACIN 500 MG/1
500 TABLET ORAL 2 TIMES DAILY
Qty: 20 TABLET | Refills: 0 | Status: SHIPPED | OUTPATIENT
Start: 2017-01-01 | End: 2017-01-01

## 2017-01-01 RX ORDER — FUROSEMIDE 40 MG/1
40 TABLET ORAL DAILY
COMMUNITY

## 2017-01-01 RX ORDER — MORPHINE SULFATE 2 MG/ML
2 INJECTION, SOLUTION INTRAMUSCULAR; INTRAVENOUS
Status: DISCONTINUED | OUTPATIENT
Start: 2017-01-01 | End: 2017-01-01 | Stop reason: HOSPADM

## 2017-01-01 RX ORDER — POTASSIUM CHLORIDE 20 MEQ/1
20 TABLET, EXTENDED RELEASE ORAL 2 TIMES DAILY
Status: DISCONTINUED | OUTPATIENT
Start: 2017-01-01 | End: 2017-01-01 | Stop reason: HOSPADM

## 2017-01-01 RX ORDER — IPRATROPIUM BROMIDE AND ALBUTEROL SULFATE 2.5; .5 MG/3ML; MG/3ML
3 SOLUTION RESPIRATORY (INHALATION) EVERY 6 HOURS PRN
Status: DISCONTINUED | OUTPATIENT
Start: 2017-01-01 | End: 2017-01-01 | Stop reason: ALTCHOICE

## 2017-01-01 RX ORDER — OXYBUTYNIN CHLORIDE 5 MG/1
5 TABLET, EXTENDED RELEASE ORAL DAILY
Status: DISCONTINUED | OUTPATIENT
Start: 2017-01-01 | End: 2017-01-01 | Stop reason: HOSPADM

## 2017-01-01 RX ORDER — POTASSIUM CHLORIDE 20 MEQ/15ML
20 SOLUTION ORAL 2 TIMES DAILY
Status: DISCONTINUED | OUTPATIENT
Start: 2017-01-01 | End: 2017-01-01

## 2017-01-01 RX ORDER — METOPROLOL TARTRATE 25 MG/1
25 TABLET, FILM COATED ORAL 2 TIMES DAILY
Status: DISCONTINUED | OUTPATIENT
Start: 2017-01-01 | End: 2017-01-01 | Stop reason: HOSPADM

## 2017-01-01 RX ORDER — ZIPRASIDONE MESYLATE 20 MG/ML
10 INJECTION, POWDER, LYOPHILIZED, FOR SOLUTION INTRAMUSCULAR EVERY 6 HOURS PRN
Status: DISCONTINUED | OUTPATIENT
Start: 2017-01-01 | End: 2017-01-01 | Stop reason: HOSPADM

## 2017-01-01 RX ORDER — METFORMIN HYDROCHLORIDE 500 MG/1
1000 TABLET ORAL 2 TIMES DAILY WITH MEALS
Status: DISCONTINUED | OUTPATIENT
Start: 2017-01-01 | End: 2017-01-01

## 2017-01-01 RX ORDER — LORAZEPAM 1 MG/1
TABLET ORAL
Status: DISCONTINUED
Start: 2017-01-01 | End: 2017-01-01 | Stop reason: HOSPADM

## 2017-01-01 RX ORDER — DONEPEZIL HYDROCHLORIDE 5 MG/1
10 TABLET, FILM COATED ORAL NIGHTLY
Status: DISCONTINUED | OUTPATIENT
Start: 2017-01-01 | End: 2017-01-01 | Stop reason: HOSPADM

## 2017-01-01 RX ORDER — DOCUSATE SODIUM 100 MG/1
100 CAPSULE, LIQUID FILLED ORAL 2 TIMES DAILY
COMMUNITY

## 2017-01-01 RX ORDER — LACTULOSE 10 G/15ML
20 SOLUTION ORAL 2 TIMES DAILY
COMMUNITY

## 2017-01-01 RX ORDER — SODIUM CHLORIDE 0.9 % (FLUSH) 0.9 %
3 SYRINGE (ML) INJECTION EVERY 8 HOURS
Status: DISCONTINUED | OUTPATIENT
Start: 2017-01-01 | End: 2017-01-01 | Stop reason: ALTCHOICE

## 2017-01-01 RX ORDER — ONDANSETRON 2 MG/ML
4 INJECTION INTRAMUSCULAR; INTRAVENOUS EVERY 8 HOURS PRN
Status: DISCONTINUED | OUTPATIENT
Start: 2017-01-01 | End: 2017-01-01 | Stop reason: HOSPADM

## 2017-01-01 RX ORDER — SCOLOPAMINE TRANSDERMAL SYSTEM 1 MG/1
1 PATCH, EXTENDED RELEASE TRANSDERMAL
Status: CANCELLED | OUTPATIENT
Start: 2017-01-01

## 2017-01-01 RX ORDER — ONDANSETRON 2 MG/ML
4 INJECTION INTRAMUSCULAR; INTRAVENOUS EVERY 6 HOURS PRN
Status: DISCONTINUED | OUTPATIENT
Start: 2017-01-01 | End: 2017-01-01 | Stop reason: HOSPADM

## 2017-01-01 RX ORDER — FUROSEMIDE 10 MG/ML
20 INJECTION INTRAMUSCULAR; INTRAVENOUS 2 TIMES DAILY
Status: DISCONTINUED | OUTPATIENT
Start: 2017-01-01 | End: 2017-01-01

## 2017-01-01 RX ORDER — LORATADINE 10 MG/1
10 TABLET ORAL DAILY PRN
COMMUNITY

## 2017-01-01 RX ORDER — OLANZAPINE 10 MG/1
10 TABLET ORAL 2 TIMES DAILY
COMMUNITY

## 2017-01-01 RX ORDER — FUROSEMIDE 10 MG/ML
40 INJECTION INTRAMUSCULAR; INTRAVENOUS
Status: COMPLETED | OUTPATIENT
Start: 2017-01-01 | End: 2017-01-01

## 2017-01-01 RX ORDER — SERTRALINE HYDROCHLORIDE 50 MG/1
50 TABLET, FILM COATED ORAL DAILY
Status: DISCONTINUED | OUTPATIENT
Start: 2017-01-01 | End: 2017-01-01

## 2017-01-01 RX ORDER — FUROSEMIDE 10 MG/ML
20 INJECTION INTRAMUSCULAR; INTRAVENOUS
Status: DISCONTINUED | OUTPATIENT
Start: 2017-01-01 | End: 2017-01-01 | Stop reason: ALTCHOICE

## 2017-01-01 RX ORDER — IPRATROPIUM BROMIDE AND ALBUTEROL SULFATE 2.5; .5 MG/3ML; MG/3ML
3 SOLUTION RESPIRATORY (INHALATION) EVERY 6 HOURS PRN
COMMUNITY

## 2017-01-01 RX ORDER — DOCUSATE SODIUM 100 MG/1
100 CAPSULE, LIQUID FILLED ORAL 2 TIMES DAILY
Status: DISCONTINUED | OUTPATIENT
Start: 2017-01-01 | End: 2017-01-01 | Stop reason: HOSPADM

## 2017-01-01 RX ORDER — LEVOTHYROXINE SODIUM ANHYDROUS 100 UG/5ML
50 INJECTION, POWDER, LYOPHILIZED, FOR SOLUTION INTRAVENOUS DAILY
Status: DISCONTINUED | OUTPATIENT
Start: 2017-01-01 | End: 2017-01-01

## 2017-01-01 RX ORDER — MICONAZOLE NITRATE 2 %
POWDER (GRAM) TOPICAL 2 TIMES DAILY
Status: DISCONTINUED | OUTPATIENT
Start: 2017-01-01 | End: 2017-01-01

## 2017-01-01 RX ORDER — LANOLIN ALCOHOL/MO/W.PET/CERES
400 CREAM (GRAM) TOPICAL DAILY
COMMUNITY

## 2017-01-01 RX ORDER — LACTULOSE 10 G/15ML
20 SOLUTION ORAL 2 TIMES DAILY
Status: DISCONTINUED | OUTPATIENT
Start: 2017-01-01 | End: 2017-01-01 | Stop reason: ALTCHOICE

## 2017-01-01 RX ORDER — QUETIAPINE FUMARATE 100 MG/1
150 TABLET, FILM COATED ORAL NIGHTLY
COMMUNITY

## 2017-01-01 RX ORDER — SERTRALINE HYDROCHLORIDE 50 MG/1
50 TABLET, FILM COATED ORAL DAILY
Status: DISCONTINUED | OUTPATIENT
Start: 2017-01-01 | End: 2017-01-01 | Stop reason: HOSPADM

## 2017-01-01 RX ORDER — GLUCAGON 1 MG
1 KIT INJECTION
Status: DISCONTINUED | OUTPATIENT
Start: 2017-01-01 | End: 2017-01-01 | Stop reason: HOSPADM

## 2017-01-01 RX ORDER — POTASSIUM CHLORIDE 20 MEQ/1
20 TABLET, EXTENDED RELEASE ORAL 2 TIMES DAILY
Status: DISCONTINUED | OUTPATIENT
Start: 2017-01-01 | End: 2017-01-01 | Stop reason: SDUPTHER

## 2017-01-01 RX ORDER — METFORMIN HYDROCHLORIDE 1000 MG/1
1000 TABLET ORAL 2 TIMES DAILY WITH MEALS
COMMUNITY

## 2017-01-01 RX ORDER — FERROUS SULFATE 325(65) MG
325 TABLET, DELAYED RELEASE (ENTERIC COATED) ORAL 2 TIMES DAILY
COMMUNITY

## 2017-01-01 RX ORDER — LEVOTHYROXINE SODIUM 100 UG/1
100 TABLET ORAL
Status: DISCONTINUED | OUTPATIENT
Start: 2017-01-01 | End: 2017-01-01 | Stop reason: HOSPADM

## 2017-01-01 RX ORDER — HALOPERIDOL 1 MG/1
2 TABLET ORAL
Status: DISCONTINUED | OUTPATIENT
Start: 2017-01-01 | End: 2017-01-01

## 2017-01-01 RX ORDER — FUROSEMIDE 10 MG/ML
40 INJECTION INTRAMUSCULAR; INTRAVENOUS 2 TIMES DAILY
Status: DISCONTINUED | OUTPATIENT
Start: 2017-01-01 | End: 2017-01-01 | Stop reason: ALTCHOICE

## 2017-01-01 RX ORDER — CLONAZEPAM 1 MG/1
1 TABLET ORAL EVERY MORNING
COMMUNITY

## 2017-01-01 RX ORDER — MORPHINE SULFATE 2 MG/ML
2 INJECTION, SOLUTION INTRAMUSCULAR; INTRAVENOUS EVERY 4 HOURS PRN
Status: DISCONTINUED | OUTPATIENT
Start: 2017-01-01 | End: 2017-01-01 | Stop reason: HOSPADM

## 2017-01-01 RX ORDER — LANOLIN ALCOHOL/MO/W.PET/CERES
400 CREAM (GRAM) TOPICAL DAILY
Status: DISCONTINUED | OUTPATIENT
Start: 2017-01-01 | End: 2017-01-01

## 2017-01-01 RX ORDER — POTASSIUM CHLORIDE 20 MEQ/15ML
40 SOLUTION ORAL DAILY
Status: DISCONTINUED | OUTPATIENT
Start: 2017-01-01 | End: 2017-01-01

## 2017-01-01 RX ORDER — SODIUM CHLORIDE 9 MG/ML
1000 INJECTION, SOLUTION INTRAVENOUS CONTINUOUS
Status: DISCONTINUED | OUTPATIENT
Start: 2017-01-01 | End: 2017-01-01

## 2017-01-01 RX ORDER — LEVOTHYROXINE SODIUM ANHYDROUS 100 UG/5ML
75 INJECTION, POWDER, LYOPHILIZED, FOR SOLUTION INTRAVENOUS DAILY
Status: DISCONTINUED | OUTPATIENT
Start: 2017-01-01 | End: 2017-01-01

## 2017-01-01 RX ORDER — NAPROXEN SODIUM 220 MG/1
81 TABLET, FILM COATED ORAL DAILY
Status: DISCONTINUED | OUTPATIENT
Start: 2017-01-01 | End: 2017-01-01 | Stop reason: ALTCHOICE

## 2017-01-01 RX ORDER — CIPROFLOXACIN 2 MG/ML
400 INJECTION, SOLUTION INTRAVENOUS
Status: COMPLETED | OUTPATIENT
Start: 2017-01-01 | End: 2017-01-01

## 2017-01-01 RX ORDER — IPRATROPIUM BROMIDE AND ALBUTEROL SULFATE 2.5; .5 MG/3ML; MG/3ML
3 SOLUTION RESPIRATORY (INHALATION) EVERY 6 HOURS PRN
Status: DISCONTINUED | OUTPATIENT
Start: 2017-01-01 | End: 2017-01-01

## 2017-01-01 RX ORDER — CIPROFLOXACIN 500 MG/1
500 TABLET ORAL
Status: COMPLETED | OUTPATIENT
Start: 2017-01-01 | End: 2017-01-01

## 2017-01-01 RX ORDER — LEVOTHYROXINE SODIUM ANHYDROUS 100 UG/5ML
100 INJECTION, POWDER, LYOPHILIZED, FOR SOLUTION INTRAVENOUS DAILY
Status: DISCONTINUED | OUTPATIENT
Start: 2017-01-01 | End: 2017-01-01

## 2017-01-01 RX ORDER — INSULIN ASPART 100 [IU]/ML
0-5 INJECTION, SOLUTION INTRAVENOUS; SUBCUTANEOUS EVERY 6 HOURS PRN
Status: DISCONTINUED | OUTPATIENT
Start: 2017-01-01 | End: 2017-01-01 | Stop reason: ALTCHOICE

## 2017-01-01 RX ORDER — LORAZEPAM 1 MG/1
1 TABLET ORAL
Status: COMPLETED | OUTPATIENT
Start: 2017-01-01 | End: 2017-01-01

## 2017-01-01 RX ORDER — OMEPRAZOLE 20 MG/1
20 CAPSULE, DELAYED RELEASE ORAL DAILY
COMMUNITY

## 2017-01-01 RX ADMIN — DOCUSATE SODIUM 100 MG: 100 CAPSULE, LIQUID FILLED ORAL at 08:06

## 2017-01-01 RX ADMIN — METOPROLOL TARTRATE 25 MG: 25 TABLET, FILM COATED ORAL at 08:07

## 2017-01-01 RX ADMIN — PIPERACILLIN SODIUM AND TAZOBACTAM SODIUM 4.5 G: 4; .5 INJECTION, POWDER, FOR SOLUTION INTRAVENOUS at 01:07

## 2017-01-01 RX ADMIN — PANTOPRAZOLE SODIUM 40 MG: 40 INJECTION, POWDER, FOR SOLUTION INTRAVENOUS at 08:08

## 2017-01-01 RX ADMIN — BIMATOPROST 1 DROP: 0.1 SOLUTION/ DROPS OPHTHALMIC at 10:07

## 2017-01-01 RX ADMIN — PIPERACILLIN SODIUM AND TAZOBACTAM SODIUM 4.5 G: 4; .5 INJECTION, POWDER, FOR SOLUTION INTRAVENOUS at 05:07

## 2017-01-01 RX ADMIN — CIPROFLOXACIN 400 MG: 2 INJECTION, SOLUTION INTRAVENOUS at 01:08

## 2017-01-01 RX ADMIN — FUROSEMIDE 40 MG: 10 INJECTION, SOLUTION INTRAMUSCULAR; INTRAVENOUS at 09:07

## 2017-01-01 RX ADMIN — VANCOMYCIN HYDROCHLORIDE 1250 MG: 1 INJECTION, POWDER, LYOPHILIZED, FOR SOLUTION INTRAVENOUS at 02:08

## 2017-01-01 RX ADMIN — FUROSEMIDE 20 MG: 10 INJECTION, SOLUTION INTRAMUSCULAR; INTRAVENOUS at 03:07

## 2017-01-01 RX ADMIN — METHYLPREDNISOLONE SODIUM SUCCINATE 80 MG: 125 INJECTION, POWDER, FOR SOLUTION INTRAMUSCULAR; INTRAVENOUS at 05:08

## 2017-01-01 RX ADMIN — FUROSEMIDE 40 MG: 10 INJECTION, SOLUTION INTRAMUSCULAR; INTRAVENOUS at 03:07

## 2017-01-01 RX ADMIN — SODIUM CHLORIDE, PRESERVATIVE FREE 3 ML: 5 INJECTION INTRAVENOUS at 02:07

## 2017-01-01 RX ADMIN — FUROSEMIDE 20 MG: 10 INJECTION, SOLUTION INTRAMUSCULAR; INTRAVENOUS at 08:07

## 2017-01-01 RX ADMIN — DONEPEZIL HYDROCHLORIDE 10 MG: 5 TABLET, FILM COATED ORAL at 12:06

## 2017-01-01 RX ADMIN — PIPERACILLIN SODIUM AND TAZOBACTAM SODIUM 4.5 G: 4; .5 INJECTION, POWDER, FOR SOLUTION INTRAVENOUS at 08:07

## 2017-01-01 RX ADMIN — LABETALOL HYDROCHLORIDE 20 MG: 5 INJECTION, SOLUTION INTRAVENOUS at 11:08

## 2017-01-01 RX ADMIN — LACTULOSE 20 G: 20 SOLUTION ORAL at 08:07

## 2017-01-01 RX ADMIN — Medication: at 08:08

## 2017-01-01 RX ADMIN — SODIUM CHLORIDE, PRESERVATIVE FREE 3 ML: 5 INJECTION INTRAVENOUS at 03:07

## 2017-01-01 RX ADMIN — FUROSEMIDE 40 MG: 10 INJECTION, SOLUTION INTRAMUSCULAR; INTRAVENOUS at 02:06

## 2017-01-01 RX ADMIN — PIPERACILLIN SODIUM AND TAZOBACTAM SODIUM 4.5 G: 4; .5 INJECTION, POWDER, FOR SOLUTION INTRAVENOUS at 08:08

## 2017-01-01 RX ADMIN — DOCUSATE SODIUM 100 MG: 100 CAPSULE, LIQUID FILLED ORAL at 09:06

## 2017-01-01 RX ADMIN — PANTOPRAZOLE SODIUM 40 MG: 40 TABLET, DELAYED RELEASE ORAL at 09:06

## 2017-01-01 RX ADMIN — BIMATOPROST 1 DROP: 0.3 SOLUTION/ DROPS OPHTHALMIC at 08:08

## 2017-01-01 RX ADMIN — FUROSEMIDE 40 MG: 10 INJECTION, SOLUTION INTRAMUSCULAR; INTRAVENOUS at 06:07

## 2017-01-01 RX ADMIN — FLUCONAZOLE 100 MG: 2 INJECTION INTRAVENOUS at 02:08

## 2017-01-01 RX ADMIN — ACETAMINOPHEN 1000 MG: 10 INJECTION, SOLUTION INTRAVENOUS at 09:07

## 2017-01-01 RX ADMIN — CIPROFLOXACIN 500 MG: 500 TABLET, FILM COATED ORAL at 11:05

## 2017-01-01 RX ADMIN — IPRATROPIUM BROMIDE AND ALBUTEROL SULFATE 3 ML: .5; 3 SOLUTION RESPIRATORY (INHALATION) at 07:08

## 2017-01-01 RX ADMIN — ASPIRIN 81 MG CHEWABLE TABLET 81 MG: 81 TABLET CHEWABLE at 09:06

## 2017-01-01 RX ADMIN — ENOXAPARIN SODIUM 40 MG: 100 INJECTION SUBCUTANEOUS at 11:08

## 2017-01-01 RX ADMIN — ACETAMINOPHEN 1000 MG: 10 INJECTION, SOLUTION INTRAVENOUS at 05:07

## 2017-01-01 RX ADMIN — ZIPRASIDONE MESYLATE 10 MG: 20 INJECTION, POWDER, LYOPHILIZED, FOR SOLUTION INTRAMUSCULAR at 02:08

## 2017-01-01 RX ADMIN — METOPROLOL TARTRATE 25 MG: 25 TABLET ORAL at 08:06

## 2017-01-01 RX ADMIN — LACTULOSE 20 G: 20 SOLUTION ORAL at 08:06

## 2017-01-01 RX ADMIN — LACTULOSE 20 G: 20 SOLUTION ORAL at 12:06

## 2017-01-01 RX ADMIN — IPRATROPIUM BROMIDE AND ALBUTEROL SULFATE 3 ML: .5; 3 SOLUTION RESPIRATORY (INHALATION) at 01:07

## 2017-01-01 RX ADMIN — SCOPALAMINE 1.5 MG: 1 PATCH, EXTENDED RELEASE TRANSDERMAL at 08:08

## 2017-01-01 RX ADMIN — SODIUM CHLORIDE, PRESERVATIVE FREE 3 ML: 5 INJECTION INTRAVENOUS at 10:07

## 2017-01-01 RX ADMIN — PIPERACILLIN SODIUM AND TAZOBACTAM SODIUM 4.5 G: 4; .5 INJECTION, POWDER, FOR SOLUTION INTRAVENOUS at 04:08

## 2017-01-01 RX ADMIN — FUROSEMIDE 20 MG: 10 INJECTION, SOLUTION INTRAMUSCULAR; INTRAVENOUS at 06:07

## 2017-01-01 RX ADMIN — METHYLPREDNISOLONE SODIUM SUCCINATE 80 MG: 125 INJECTION, POWDER, FOR SOLUTION INTRAMUSCULAR; INTRAVENOUS at 02:08

## 2017-01-01 RX ADMIN — LEVOTHYROXINE SODIUM ANHYDROUS 50 MCG: 100 INJECTION, POWDER, LYOPHILIZED, FOR SOLUTION INTRAVENOUS at 11:08

## 2017-01-01 RX ADMIN — PIPERACILLIN SODIUM AND TAZOBACTAM SODIUM 4.5 G: 4; .5 INJECTION, POWDER, FOR SOLUTION INTRAVENOUS at 07:08

## 2017-01-01 RX ADMIN — ACETAMINOPHEN 1000 MG: 10 INJECTION, SOLUTION INTRAVENOUS at 02:08

## 2017-01-01 RX ADMIN — PIPERACILLIN SODIUM AND TAZOBACTAM SODIUM 4.5 G: 4; .5 INJECTION, POWDER, FOR SOLUTION INTRAVENOUS at 12:08

## 2017-01-01 RX ADMIN — SODIUM CHLORIDE, PRESERVATIVE FREE 3 ML: 5 INJECTION INTRAVENOUS at 05:07

## 2017-01-01 RX ADMIN — BIMATOPROST 1 DROP: 0.1 SOLUTION/ DROPS OPHTHALMIC at 08:07

## 2017-01-01 RX ADMIN — OXYBUTYNIN CHLORIDE 5 MG: 5 TABLET, EXTENDED RELEASE ORAL at 08:06

## 2017-01-01 RX ADMIN — ENOXAPARIN SODIUM 40 MG: 100 INJECTION SUBCUTANEOUS at 06:07

## 2017-01-01 RX ADMIN — POTASSIUM CHLORIDE 40 MEQ: 10 INJECTION, SOLUTION INTRAVENOUS at 02:07

## 2017-01-01 RX ADMIN — BIMATOPROST 1 DROP: 0.1 SOLUTION/ DROPS OPHTHALMIC at 08:06

## 2017-01-01 RX ADMIN — FUROSEMIDE 20 MG: 10 INJECTION, SOLUTION INTRAMUSCULAR; INTRAVENOUS at 07:07

## 2017-01-01 RX ADMIN — MORPHINE SULFATE 2 MG: 2 INJECTION, SOLUTION INTRAMUSCULAR; INTRAVENOUS at 11:08

## 2017-01-01 RX ADMIN — MORPHINE SULFATE 2 MG: 2 INJECTION, SOLUTION INTRAMUSCULAR; INTRAVENOUS at 12:08

## 2017-01-01 RX ADMIN — BIMATOPROST 1 DROP: 0.1 SOLUTION/ DROPS OPHTHALMIC at 12:07

## 2017-01-01 RX ADMIN — DOCUSATE SODIUM 100 MG: 100 CAPSULE, LIQUID FILLED ORAL at 12:06

## 2017-01-01 RX ADMIN — Medication 400 MG: at 09:06

## 2017-01-01 RX ADMIN — ASPIRIN 81 MG CHEWABLE TABLET 81 MG: 81 TABLET CHEWABLE at 08:06

## 2017-01-01 RX ADMIN — METHYLPREDNISOLONE SODIUM SUCCINATE 80 MG: 125 INJECTION, POWDER, FOR SOLUTION INTRAMUSCULAR; INTRAVENOUS at 09:08

## 2017-01-01 RX ADMIN — DONEPEZIL HYDROCHLORIDE 10 MG: 5 TABLET, FILM COATED ORAL at 08:06

## 2017-01-01 RX ADMIN — ZIPRASIDONE MESYLATE 10 MG: 20 INJECTION, POWDER, LYOPHILIZED, FOR SOLUTION INTRAMUSCULAR at 08:07

## 2017-01-01 RX ADMIN — FUROSEMIDE 20 MG: 10 INJECTION, SOLUTION INTRAMUSCULAR; INTRAVENOUS at 01:08

## 2017-01-01 RX ADMIN — MORPHINE SULFATE 2 MG: 2 INJECTION, SOLUTION INTRAMUSCULAR; INTRAVENOUS at 02:08

## 2017-01-01 RX ADMIN — POTASSIUM CHLORIDE 20 MEQ: 1500 TABLET, EXTENDED RELEASE ORAL at 12:06

## 2017-01-01 RX ADMIN — FERROUS SULFATE TAB EC 325 MG (65 MG FE EQUIVALENT) 325 MG: 325 (65 FE) TABLET DELAYED RESPONSE at 08:06

## 2017-01-01 RX ADMIN — ASPIRIN 81 MG CHEWABLE TABLET 81 MG: 81 TABLET CHEWABLE at 08:07

## 2017-01-01 RX ADMIN — LEVOTHYROXINE SODIUM 100 MCG: 50 TABLET ORAL at 06:07

## 2017-01-01 RX ADMIN — POTASSIUM CHLORIDE 40 MEQ: 20 SOLUTION ORAL at 10:07

## 2017-01-01 RX ADMIN — LACTULOSE 20 G: 20 SOLUTION ORAL at 11:07

## 2017-01-01 RX ADMIN — BIMATOPROST 1 DROP: 0.1 SOLUTION/ DROPS OPHTHALMIC at 12:06

## 2017-01-01 RX ADMIN — POTASSIUM CHLORIDE 10 MEQ: 10 INJECTION, SOLUTION INTRAVENOUS at 07:07

## 2017-01-01 RX ADMIN — PIPERACILLIN SODIUM AND TAZOBACTAM SODIUM 4.5 G: 4; .5 INJECTION, POWDER, FOR SOLUTION INTRAVENOUS at 05:08

## 2017-01-01 RX ADMIN — SERTRALINE HYDROCHLORIDE 50 MG: 50 TABLET ORAL at 08:06

## 2017-01-01 RX ADMIN — SODIUM CHLORIDE, PRESERVATIVE FREE 6 ML: 5 INJECTION INTRAVENOUS at 08:07

## 2017-01-01 RX ADMIN — IPRATROPIUM BROMIDE AND ALBUTEROL SULFATE 3 ML: .5; 3 SOLUTION RESPIRATORY (INHALATION) at 08:07

## 2017-01-01 RX ADMIN — OLANZAPINE 10 MG: 5 TABLET, FILM COATED ORAL at 08:06

## 2017-01-01 RX ADMIN — CIPROFLOXACIN 400 MG: 2 INJECTION, SOLUTION INTRAVENOUS at 12:08

## 2017-01-01 RX ADMIN — PANTOPRAZOLE SODIUM 40 MG: 40 TABLET, DELAYED RELEASE ORAL at 08:06

## 2017-01-01 RX ADMIN — POTASSIUM CHLORIDE 20 MEQ: 1500 TABLET, EXTENDED RELEASE ORAL at 09:06

## 2017-01-01 RX ADMIN — LORAZEPAM 1 MG: 2 INJECTION INTRAMUSCULAR; INTRAVENOUS at 09:08

## 2017-01-01 RX ADMIN — LORAZEPAM 1 MG: 2 INJECTION INTRAMUSCULAR; INTRAVENOUS at 01:08

## 2017-01-01 RX ADMIN — VANCOMYCIN HYDROCHLORIDE 750 MG: 1 INJECTION, POWDER, LYOPHILIZED, FOR SOLUTION INTRAVENOUS at 03:08

## 2017-01-01 RX ADMIN — FUROSEMIDE 40 MG: 10 INJECTION, SOLUTION INTRAMUSCULAR; INTRAVENOUS at 11:07

## 2017-01-01 RX ADMIN — LORAZEPAM 1 MG: 2 INJECTION INTRAMUSCULAR; INTRAVENOUS at 02:08

## 2017-01-01 RX ADMIN — METOPROLOL TARTRATE 25 MG: 25 TABLET ORAL at 12:06

## 2017-01-01 RX ADMIN — LORAZEPAM 1 MG: 1 TABLET ORAL at 10:05

## 2017-01-01 RX ADMIN — METHYLPREDNISOLONE SODIUM SUCCINATE 80 MG: 125 INJECTION, POWDER, FOR SOLUTION INTRAMUSCULAR; INTRAVENOUS at 04:08

## 2017-01-01 RX ADMIN — PIPERACILLIN SODIUM AND TAZOBACTAM SODIUM 4.5 G: 4; .5 INJECTION, POWDER, FOR SOLUTION INTRAVENOUS at 11:08

## 2017-01-01 RX ADMIN — IPRATROPIUM BROMIDE AND ALBUTEROL SULFATE 3 ML: .5; 3 SOLUTION RESPIRATORY (INHALATION) at 12:08

## 2017-01-01 RX ADMIN — IPRATROPIUM BROMIDE AND ALBUTEROL SULFATE 3 ML: .5; 3 SOLUTION RESPIRATORY (INHALATION) at 12:07

## 2017-01-01 RX ADMIN — ZIPRASIDONE MESYLATE 10 MG: 20 INJECTION, POWDER, LYOPHILIZED, FOR SOLUTION INTRAMUSCULAR at 05:07

## 2017-01-01 RX ADMIN — FERROUS SULFATE TAB EC 325 MG (65 MG FE EQUIVALENT) 325 MG: 325 (65 FE) TABLET DELAYED RESPONSE at 09:06

## 2017-01-01 RX ADMIN — IPRATROPIUM BROMIDE AND ALBUTEROL SULFATE 3 ML: .5; 3 SOLUTION RESPIRATORY (INHALATION) at 07:07

## 2017-01-01 RX ADMIN — ACETAMINOPHEN 1000 MG: 10 INJECTION, SOLUTION INTRAVENOUS at 05:08

## 2017-01-01 RX ADMIN — LACTOBACILLUS ACIDOPHILUS / LACTOBACILLUS BULGARICUS 1 EACH: 100 MILLION CFU STRENGTH GRANULES at 08:06

## 2017-01-01 RX ADMIN — VANCOMYCIN HYDROCHLORIDE 1500 MG: 1 INJECTION, POWDER, LYOPHILIZED, FOR SOLUTION INTRAVENOUS at 02:08

## 2017-01-01 RX ADMIN — METOPROLOL TARTRATE 25 MG: 25 TABLET, FILM COATED ORAL at 11:07

## 2017-01-01 RX ADMIN — FUROSEMIDE 40 MG: 10 INJECTION, SOLUTION INTRAMUSCULAR; INTRAVENOUS at 02:08

## 2017-01-01 RX ADMIN — POTASSIUM CHLORIDE 20 MEQ: 1500 TABLET, EXTENDED RELEASE ORAL at 08:06

## 2017-01-01 RX ADMIN — Medication: at 11:08

## 2017-01-01 RX ADMIN — HYPROMELLOSE 2910 1 DROP: 5 SOLUTION OPHTHALMIC at 10:07

## 2017-01-01 RX ADMIN — ENOXAPARIN SODIUM 40 MG: 100 INJECTION SUBCUTANEOUS at 10:08

## 2017-01-01 RX ADMIN — ACETAMINOPHEN 1000 MG: 10 INJECTION, SOLUTION INTRAVENOUS at 09:08

## 2017-01-01 RX ADMIN — FLUCONAZOLE 100 MG: 2 INJECTION INTRAVENOUS at 12:08

## 2017-01-01 RX ADMIN — LORAZEPAM 1 MG: 2 INJECTION INTRAMUSCULAR; INTRAVENOUS at 04:08

## 2017-01-01 RX ADMIN — POTASSIUM CHLORIDE 10 MEQ: 10 INJECTION, SOLUTION INTRAVENOUS at 06:07

## 2017-01-01 RX ADMIN — CIPROFLOXACIN 400 MG: 2 INJECTION, SOLUTION INTRAVENOUS at 02:08

## 2017-01-01 RX ADMIN — Medication 500 MG: at 10:07

## 2017-01-01 RX ADMIN — OLANZAPINE 10 MG: 5 TABLET, FILM COATED ORAL at 09:06

## 2017-01-01 RX ADMIN — IPRATROPIUM BROMIDE AND ALBUTEROL SULFATE 3 ML: .5; 3 SOLUTION RESPIRATORY (INHALATION) at 10:07

## 2017-01-01 RX ADMIN — BIMATOPROST 1 DROP: 0.3 SOLUTION/ DROPS OPHTHALMIC at 11:08

## 2017-01-01 RX ADMIN — Medication 400 MG: at 08:06

## 2017-01-01 RX ADMIN — LORAZEPAM 1 MG: 2 INJECTION INTRAMUSCULAR; INTRAVENOUS at 07:08

## 2017-01-01 RX ADMIN — ZIPRASIDONE MESYLATE 10 MG: 20 INJECTION, POWDER, LYOPHILIZED, FOR SOLUTION INTRAMUSCULAR at 03:07

## 2017-01-01 RX ADMIN — SODIUM CHLORIDE, PRESERVATIVE FREE 3 ML: 5 INJECTION INTRAVENOUS at 09:07

## 2017-01-01 RX ADMIN — LACTOBACILLUS ACIDOPHILUS / LACTOBACILLUS BULGARICUS 1 EACH: 100 MILLION CFU STRENGTH GRANULES at 12:06

## 2017-01-01 RX ADMIN — LEVOTHYROXINE SODIUM 100 MCG: 100 TABLET ORAL at 07:06

## 2017-01-01 RX ADMIN — LEVOTHYROXINE SODIUM ANHYDROUS 50 MCG: 100 INJECTION, POWDER, LYOPHILIZED, FOR SOLUTION INTRAVENOUS at 09:07

## 2017-01-01 RX ADMIN — FLUCONAZOLE 100 MG: 2 INJECTION INTRAVENOUS at 11:08

## 2017-01-01 RX ADMIN — POTASSIUM CHLORIDE 10 MEQ: 10 INJECTION, SOLUTION INTRAVENOUS at 08:07

## 2017-01-01 RX ADMIN — SERTRALINE HYDROCHLORIDE 50 MG: 50 TABLET ORAL at 09:06

## 2017-01-01 RX ADMIN — CLONAZEPAM 1 MG: 1 TABLET ORAL at 08:06

## 2017-01-01 RX ADMIN — PANTOPRAZOLE SODIUM 40 MG: 40 INJECTION, POWDER, FOR SOLUTION INTRAVENOUS at 09:08

## 2017-01-01 RX ADMIN — POTASSIUM CHLORIDE 10 MEQ: 10 INJECTION, SOLUTION INTRAVENOUS at 09:07

## 2017-01-01 RX ADMIN — LEVOTHYROXINE SODIUM 100 MCG: 100 TABLET ORAL at 05:06

## 2017-01-01 RX ADMIN — FUROSEMIDE 40 MG: 10 INJECTION, SOLUTION INTRAMUSCULAR; INTRAVENOUS at 11:08

## 2017-01-01 RX ADMIN — OXYBUTYNIN CHLORIDE 5 MG: 5 TABLET, EXTENDED RELEASE ORAL at 09:06

## 2017-01-01 RX ADMIN — IPRATROPIUM BROMIDE AND ALBUTEROL SULFATE 3 ML: .5; 3 SOLUTION RESPIRATORY (INHALATION) at 01:08

## 2017-01-01 RX ADMIN — LACTOBACILLUS ACIDOPHILUS / LACTOBACILLUS BULGARICUS 1 EACH: 100 MILLION CFU STRENGTH GRANULES at 09:06

## 2017-01-01 RX ADMIN — LACTULOSE 20 G: 20 SOLUTION ORAL at 09:06

## 2017-01-01 RX ADMIN — METOPROLOL TARTRATE 25 MG: 25 TABLET ORAL at 09:06

## 2017-01-01 RX ADMIN — MORPHINE SULFATE 2 MG: 2 INJECTION, SOLUTION INTRAMUSCULAR; INTRAVENOUS at 08:08

## 2017-01-01 RX ADMIN — VANCOMYCIN HYDROCHLORIDE 1500 MG: 1 INJECTION, POWDER, LYOPHILIZED, FOR SOLUTION INTRAVENOUS at 03:08

## 2017-01-01 RX ADMIN — FERROUS SULFATE TAB EC 325 MG (65 MG FE EQUIVALENT) 325 MG: 325 (65 FE) TABLET DELAYED RESPONSE at 12:06

## 2017-01-01 RX ADMIN — LORAZEPAM 1 MG: 2 INJECTION INTRAMUSCULAR; INTRAVENOUS at 03:08

## 2017-05-29 NOTE — ED PROVIDER NOTES
Encounter Date: 5/29/2017       History     Chief Complaint   Patient presents with    Agitation     Review of patient's allergies indicates:   Allergen Reactions    Bacitracin      77-year-old female presents from [facility] for violent behavior patient has a history of dementia and schizophrenia patient has been noncompliant with her medication, patient is confused and demented patient uncooperative but noncombative, patient has no complaints at this time          Past Medical History:   Diagnosis Date    Arthritis     Cancer     Coronary artery disease     Dementia     Fractures     lt humerus    Hypertension     Schizophrenia     Thyroid disease      Past Surgical History:   Procedure Laterality Date    APPENDECTOMY      colon mass removal    tonsillectomy       Family History   Problem Relation Age of Onset    Anesthesia problems Neg Hx     Clotting disorder Neg Hx      Social History   Substance Use Topics    Smoking status: Unknown If Ever Smoked    Smokeless tobacco: Not on file    Alcohol use No     Review of Systems   Unable to perform ROS: Dementia   All other systems reviewed and are negative.      Physical Exam     Initial Vitals   BP Pulse Resp Temp SpO2   -- -- -- -- --     Physical Exam    Nursing note and vitals reviewed.  Constitutional: She appears well-developed and well-nourished. She is not diaphoretic. No distress.   HENT:   Head: Normocephalic and atraumatic.   Right Ear: External ear normal.   Left Ear: External ear normal.   Nose: Nose normal.   Mouth/Throat: Oropharynx is clear and moist. No oropharyngeal exudate.   Eyes: Conjunctivae and EOM are normal. Pupils are equal, round, and reactive to light. Right eye exhibits no discharge. Left eye exhibits no discharge. No scleral icterus.   Neck: Normal range of motion. Neck supple. No thyromegaly present. No tracheal deviation present. No JVD present.   Cardiovascular: Normal rate, regular rhythm, normal heart sounds and  intact distal pulses. Exam reveals no gallop and no friction rub.    No murmur heard.  Pulmonary/Chest: Breath sounds normal. No stridor. No respiratory distress. She has no wheezes. She has no rhonchi. She has no rales. She exhibits no tenderness.   Abdominal: Soft. Bowel sounds are normal. She exhibits no distension and no mass. There is no tenderness. There is no rebound and no guarding.   Genitourinary: Rectal exam shows guaiac negative stool. Guaiac negative stool. : Acceptable.  Genitourinary Comments: Rectal exam shows normal rectal tone, no palpable mass or abscess no seizures, brown stool, occult blood negative   Musculoskeletal: Normal range of motion. She exhibits no edema or tenderness.   Lymphadenopathy:     She has no cervical adenopathy.   Neurological: She is alert and oriented to person, place, and time. She has normal strength. She displays normal reflexes. No cranial nerve deficit or sensory deficit.   Skin: Skin is warm and dry. No rash and no abscess noted. No erythema. No pallor.   Psychiatric:   Patient is agitated and demented but is verbally redirectable         ED Course   Procedures  Labs Reviewed   CBC W/ AUTO DIFFERENTIAL - Abnormal; Notable for the following:        Result Value    RBC 3.60 (*)     Hemoglobin 10.6 (*)     Hematocrit 32.0 (*)     RDW 16.5 (*)     MPV 9.0 (*)     All other components within normal limits   COMPREHENSIVE METABOLIC PANEL - Abnormal; Notable for the following:     Albumin 2.8 (*)     AST 58 (*)     All other components within normal limits   URINALYSIS - Abnormal; Notable for the following:     Specific Gravity, UA >=1.030 (*)     Protein, UA 2+ (*)     Ketones, UA 1+ (*)     Occult Blood UA 3+ (*)     Nitrite, UA Positive (*)     Leukocytes, UA 1+ (*)     All other components within normal limits   ACETAMINOPHEN LEVEL - Abnormal; Notable for the following:     Acetaminophen (Tylenol), Serum <3.0 (*)     All other components within normal  limits   URINALYSIS MICROSCOPIC - Abnormal; Notable for the following:     RBC, UA 40 (*)     WBC, UA 30 (*)     Bacteria, UA Many (*)     Non-Squam Epith 5 (*)     All other components within normal limits   TSH   DRUG SCREEN PANEL, URINE EMERGENCY   ALCOHOL,MEDICAL (ETHANOL)             Medical Decision Making:   History:   Old Medical Records: I decided to obtain old medical records.  Initial Assessment:   Emergent evaluation of a 77-year-old female was dementia and schizophrenia presenting today for PEC and psychiatric evaluation, patient will receive medical screening labs and we'll transfer to psychiatric facility once medically cleared.              Attending Attestation:             Attending ED Notes:   Patient noted to have urinary tract infection, she will be started on a course of Cipro and transferred to psychiatric facility for further evaluation and management      Patient is medically cleared for psychiatric evaluation at this time          ED Course     Clinical Impression:   The primary encounter diagnosis was Agitation. A diagnosis of Urinary tract infection without hematuria, site unspecified was also pertinent to this visit.          Devon Sanchez MD  05/29/17 0854

## 2017-05-29 NOTE — ED NOTES
Devon Thrasher pt has been accepted to [facility] ([address] [place]) under the care of Dr. Hardy. The number to call report [phone]. Please send Medication List with pt.

## 2017-06-22 PROBLEM — D61.818 PANCYTOPENIA: Status: ACTIVE | Noted: 2017-01-01

## 2017-06-22 NOTE — ED NOTES
Pt sent to ED from NH for possible PNA on XRay and state patient had fever. No fever now and last Tylenol was at 11am. Pt is not in distress. Patient is altered at baseline. Perineal area is red and barrier cream applied. Family at bedside but they are leaving.

## 2017-06-22 NOTE — ED PROVIDER NOTES
Encounter Date: 6/22/2017    SCRIBE #1 NOTE: I, Marga Hancock , am scribing for, and in the presence of,  Dr. Romero . I have scribed the entire note.       History     Chief Complaint   Patient presents with    Fever     sent for fever and possible PNA on Chest XRay at [facility]          06/22/2017  6:01 PM     Chief Complaint: Fever       The patient is a 77 y.o. female who is presenting per [facility] for the gradual onset of suspected PNA. HM endorses a fever and recent abnormal CXR that is remarkable for possible L and lower pneumonia cystitis. The pt now denies any complaints and is in no acute distress. She denies have a cough, HA, CP, or SOB. No other comments or complaints. Pertinent PMHx includes CAD, dementia, HTN and schizophrenia. No pertinent past surgical hx.         The history is provided by the patient, the nursing home and medical records.     Review of patient's allergies indicates:   Allergen Reactions    Bacitracin     Tuberculin      Past Medical History:   Diagnosis Date    Arthritis     Cancer     Coronary artery disease     Dementia     Fractures     lt humerus    Hypertension     Schizophrenia     Thyroid disease      Past Surgical History:   Procedure Laterality Date    APPENDECTOMY      colon mass removal    tonsillectomy       Family History   Problem Relation Age of Onset    Anesthesia problems Neg Hx     Clotting disorder Neg Hx      Social History   Substance Use Topics    Smoking status: Unknown If Ever Smoked    Smokeless tobacco: Not on file    Alcohol use No     Review of Systems   Unable to perform ROS: Dementia       Physical Exam     Initial Vitals [06/22/17 1757]   BP Pulse Resp Temp SpO2   (!) 150/66 86 20 97.2 °F (36.2 °C) 95 %      MAP       94         Physical Exam    Nursing note and vitals reviewed.  Constitutional: She appears well-developed and well-nourished.   HENT:   Head: Normocephalic and atraumatic.   Eyes: EOM are normal. Pupils  are equal, round, and reactive to light.   Neck: Normal range of motion. Neck supple.   Cardiovascular: Normal rate, regular rhythm and intact distal pulses. Exam reveals no gallop and no friction rub.    Murmur (1/6 systolic mumur ) heard.  Pulmonary/Chest: She has no wheezes. She has rhonchi (R basal rhonchi ). She has no rales.   Abdominal: Soft. She exhibits no distension. There is no tenderness.   Musculoskeletal: Normal range of motion.   Neurological: She is alert. She has normal strength. No sensory deficit.   Pt is at baseline mental status   Skin: Skin is warm and dry. Capillary refill takes less than 2 seconds.         ED Course   Procedures  Labs Reviewed - No data to display          Medical Decision Making:   Independently Interpreted Test(s):   I have ordered and independently interpreted X-rays - see summary below.       <> Summary of X-Ray Reading(s): chest x-ray interpreted by me reveals no infiltrates, effusions or mediastinal widening  Clinical Tests:   Lab Tests: Ordered and Reviewed  The following lab test(s) were unremarkable: CBC and CMP  ED Management:  Pham Weinberg is a 77 y.o. female who presents from the nursing home with complaints of pneumonia seen on chest x-ray with fever.  She has had no fever throughout her emergency department stay and chest x-ray fails to demonstrate any evidence of pneumonia.  She has no pyuria or bacteriuria.  She does have pancytopenia with a progressively diminishing WBC (3 versus 5 on 5-29), hemoglobin 8.3 and platelet count 85,000.  Stools are heme negative              Attending Attestation:           Physician Attestation for Scribe:  Physician Attestation Statement for Scribe #1: I, Dr. Romero, reviewed documentation, as scribed by Marga Hancock in my presence, and it is both accurate and complete.                 ED Course     Clinical Impression:   The primary encounter diagnosis was Pancytopenia. A diagnosis of Fever was also pertinent to this  visit.                           Alexey Romero III, MD  06/22/17 2046

## 2017-06-23 PROBLEM — F03.90 DEMENTIA WITHOUT BEHAVIORAL DISTURBANCE: Status: ACTIVE | Noted: 2017-01-01

## 2017-06-23 PROBLEM — I10 ESSENTIAL HYPERTENSION: Status: ACTIVE | Noted: 2017-01-01

## 2017-06-23 PROBLEM — E11.39 TYPE 2 DIABETES MELLITUS WITH OPHTHALMIC COMPLICATION, WITHOUT LONG-TERM CURRENT USE OF INSULIN: Status: ACTIVE | Noted: 2017-01-01

## 2017-06-23 PROBLEM — E03.9 HYPOTHYROIDISM: Status: ACTIVE | Noted: 2017-01-01

## 2017-06-23 PROBLEM — H40.9 GLAUCOMA OF BOTH EYES: Status: ACTIVE | Noted: 2017-01-01

## 2017-06-23 PROBLEM — I50.9 ACUTE HEART FAILURE: Status: ACTIVE | Noted: 2017-01-01

## 2017-06-23 NOTE — ASSESSMENT & PLAN NOTE
Acute. Labs 1 month ago normal. H/H, WBC and platelets decreased. Other indices decreased as well with the exception of elevated monocytes. CXR with ?mediastinal widening per my read.  Consult heme/ onc. Check labs, LDH, haptoglobin, retic count.     Likely viral. Check procalcitonin

## 2017-06-23 NOTE — UM SECONDARY REVIEW
Physician Advisor External    Level of Care Issue    Approved Observation/ OUTPT FOR ADMIT 6/23/17 per ehr md review.

## 2017-06-23 NOTE — SUBJECTIVE & OBJECTIVE
Interval History: No new issues overnight. Patient feeling better. States sob with exertion. Appears comfortable.     Review of Systems   Unable to perform ROS: Dementia     Objective:     Vital Signs (Most Recent):  Temp: 98 °F (36.7 °C) (06/23/17 1100)  Pulse: 72 (06/23/17 1100)  Resp: 17 (06/23/17 1100)  BP: (!) 113/55 (06/23/17 1100)  SpO2: 96 % (06/23/17 1100) Vital Signs (24h Range):  Temp:  [60.8 °F (16 °C)-98 °F (36.7 °C)] 98 °F (36.7 °C)  Pulse:  [69-90] 72  Resp:  [6-20] 17  SpO2:  [95 %-97 %] 96 %  BP: (113-170)/(55-75) 113/55        There is no height or weight on file to calculate BMI.    Intake/Output Summary (Last 24 hours) at 06/23/17 1651  Last data filed at 06/23/17 1300   Gross per 24 hour   Intake              840 ml   Output                0 ml   Net              840 ml      Physical Exam   Constitutional: She appears well-developed and well-nourished.   HENT:   Head: Normocephalic and atraumatic.   Mouth/Throat: Oropharynx is clear and moist.   edentulous    Eyes: Conjunctivae and EOM are normal. Pupils are equal, round, and reactive to light.   Neck: Normal range of motion. Neck supple.   Cardiovascular: Normal rate, regular rhythm and intact distal pulses.    Murmur: soft AMS, + 1 pretib edema.  Pulmonary/Chest: Effort normal. She has wheezes. She has no rales.   Exp wheezes and few bibasalar rales   Abdominal: Soft. Bowel sounds are normal.   Musculoskeletal: Normal range of motion.   Neurological: She is alert.   Oriented to self and place   Skin: Skin is warm and dry.   Psychiatric: She has a normal mood and affect. Her behavior is normal. Judgment normal.   Nursing note and vitals reviewed.      Significant Labs:   BMP:   Recent Labs  Lab 06/22/17  1821   *      K 3.8      CO2 24   BUN 20   CREATININE 1.0   CALCIUM 8.6*     CBC:   Recent Labs  Lab 06/22/17  1821 06/23/17  0457   WBC 3.20* 3.30*   HGB 8.3* 8.9*   HCT 25.0* 27.2*   PLT 89* 90*       Significant Imaging: I  have reviewed and interpreted all pertinent imaging results/findings within the past 24 hours.

## 2017-06-23 NOTE — HPI
The patient is a 77 y.o. female who is presenting per [facility] for the gradual onset of suspected PNA. HM endorses a fever and recent abnormal CXR that is remarkable for possible L lower pneumonia. The pt now denies any complaints and is in no acute distress. She denies have a cough, HA, CP, or SOB. No other comments or complaints. Pertinent PMHx includes CAD, dementia, HTN and schizophrenia. No pertinent past surgical hx.     In the ED, she was found to have pancytopenia.

## 2017-06-23 NOTE — PLAN OF CARE
06/22/17 2676   Patient Assessment/Suction   Level of Consciousness (AVPU) alert   Respiratory Effort Normal;Unlabored   Expansion/Accessory Muscles/Retractions no retractions;no use of accessory muscles   All Lung Fields Breath Sounds clear   PRE-TX-O2-ETCO2   O2 Device (Oxygen Therapy) room air   SpO2 97 %   Pulse 88   Resp (!) 6   Temp (!) 60.8 °F (16 °C)   Aerosol Therapy   $ Aerosol Therapy Charges PRN treatment not required   Respiratory Treatment Status PRN treatment not required

## 2017-06-23 NOTE — PROGRESS NOTES
1420  Entered room to find patient squeezing through the side rails to get out of bed.  Instructed patient to remain in bed; patient stated she is going to the bed.  Instructed patient to wait for assistance.  CNA assisted patient to bathroom.    1425  Contacted [facility] and per Mirtha patient is SNF at their facility.  Informed Mirtha that patient is in OBS status and will likely be discharged tomorrow; Mirtha verbalized understanding.

## 2017-06-23 NOTE — PROGRESS NOTES
[facility] Hospital Medicine  Progress Note    Patient Name: Pham Weinberg  MRN: [id number]  Patient Class: OP- Observation   Admission Date: 6/22/2017  Length of Stay: 0 days  Attending Physician: Miguel Vazquez MD  Primary Care Provider: Provider Notinsystem        Subjective:     Principal Problem:Pancytopenia    HPI:  The patient is a 77 y.o. female who is presenting per [facility] for the gradual onset of suspected PNA. HM endorses a fever and recent abnormal CXR that is remarkable for possible L lower pneumonia. The pt now denies any complaints and is in no acute distress. She denies have a cough, HA, CP, or SOB. No other comments or complaints. Pertinent PMHx includes CAD, dementia, HTN and schizophrenia. No pertinent past surgical hx.     In the ED, she was found to have pancytopenia.    Hospital Course:  No notes on file    Interval History: No new issues overnight. Patient feeling better. States sob with exertion. Appears comfortable. Remains afebrile     Review of Systems   Unable to perform ROS: Dementia     Objective:     Vital Signs (Most Recent):  Temp: 98 °F (36.7 °C) (06/23/17 1100)  Pulse: 72 (06/23/17 1100)  Resp: 17 (06/23/17 1100)  BP: (!) 113/55 (06/23/17 1100)  SpO2: 96 % (06/23/17 1100) Vital Signs (24h Range):  Temp:  [60.8 °F (16 °C)-98 °F (36.7 °C)] 98 °F (36.7 °C)  Pulse:  [69-90] 72  Resp:  [6-20] 17  SpO2:  [95 %-97 %] 96 %  BP: (113-170)/(55-75) 113/55        There is no height or weight on file to calculate BMI.    Intake/Output Summary (Last 24 hours) at 06/23/17 1651  Last data filed at 06/23/17 1300   Gross per 24 hour   Intake              840 ml   Output                0 ml   Net              840 ml      Physical Exam   Constitutional: She appears well-developed and well-nourished.   HENT:   Head: Normocephalic and atraumatic.   Mouth/Throat: Oropharynx is clear and moist.   edentulous    Eyes: Conjunctivae and EOM are normal. Pupils are equal, round,  and reactive to light.   Neck: Normal range of motion. Neck supple.   Cardiovascular: Normal rate, regular rhythm and intact distal pulses.    Murmur: soft AMS, + 1 pretib edema.  Pulmonary/Chest: Effort normal. She has wheezes. She has no rales.   Exp wheezes and few bibasalar rales   Abdominal: Soft. Bowel sounds are normal.   Musculoskeletal: Normal range of motion.   Neurological: She is alert.   Oriented to self and place   Skin: Skin is warm and dry.   Psychiatric: She has a normal mood and affect. Her behavior is normal. Judgment normal.   Nursing note and vitals reviewed.      Significant Labs:   BMP:   Recent Labs  Lab 06/22/17  1821   *      K 3.8      CO2 24   BUN 20   CREATININE 1.0   CALCIUM 8.6*     CBC:   Recent Labs  Lab 06/22/17  1821 06/23/17  0457   WBC 3.20* 3.30*   HGB 8.3* 8.9*   HCT 25.0* 27.2*   PLT 89* 90*       Significant Imaging: I have reviewed and interpreted all pertinent imaging results/findings within the past 24 hours.    Assessment/Plan:      Acute heart failure    Check BNP.  Lasix 40 mg IV x 1 dose. Echo to evaluate LV function.           Essential hypertension    Chronic. Stable. Continue home meds.           Type 2 diabetes mellitus with ophthalmic complication, without long-term current use of insulin    Check A1C. Hold oral hypoglycemics. SSI prn.           Dementia without behavioral disturbance    Chronic. Stable. Continue home meds.   delirium precautions        Hypothyroidism    Chronic. Stable. TSH normal 1 month ago. Continue current dose of synthroid.           Glaucoma of both eyes    Chronic. Continue home meds.           * Pancytopenia    Acute. Labs 1 month ago normal. H/H, WBC and platelets decreased. Other indices decreased as well with the exception of elevated monocytes. CXR with ?mediastinal widening per my read.  Consult heme/ onc. Check labs, LDH, haptoglobin, retic count.     Likely viral. Check procalcitonin           VTE Risk Mitigation          Ordered     Medium Risk of VTE  Once      06/22/17 3472        Speech evaluation for possible aspiration.    Dorina Cornejo NP  Department of Hospital Medicine   [facility]

## 2017-06-23 NOTE — PLAN OF CARE
Problem: Patient Care Overview  Goal: Plan of Care Review  Outcome: Ongoing (interventions implemented as appropriate)  Pt confused, Pt responds to questions innaprppriate. Pt safe, Pt free from falls. Call light in reach bed in low position, wheels locked, bed alarm on

## 2017-06-23 NOTE — PROGRESS NOTES
06/23/17 0700   Patient Assessment/Suction   Level of Consciousness (AVPU) alert   All Lung Fields Breath Sounds clear   PRE-TX-O2-ETCO2   O2 Device (Oxygen Therapy) room air   SpO2 97 %   Pulse Oximetry Type Intermittent   $ Pulse Oximetry - Multiple Charge Pulse Oximetry - Multiple   Aerosol Therapy   $ Aerosol Therapy Charges PRN treatment not required

## 2017-06-23 NOTE — ASSESSMENT & PLAN NOTE
Acute. Labs 1 month ago normal. H/H, WBC and platelets decreased. Other indices decreased as well with the exception of elevated monocytes. CXR with ?mediastinal widening per my read.  Consult heme/ onc. Check labs, LDH, haptoglobin, retic count.

## 2017-06-23 NOTE — PT/OT/SLP EVAL
"Speech Language Pathology  Evaluation    Pham Weinberg   MRN: [id number]   Admitting Diagnosis: Pancytopenia    Diet recommendations: Solid Diet Level:  (Dysphagia Diet Level 1)  Liquid Diet Level: [place] Thick      SLP Treatment Date: 06/23/17  Speech Start Time: 1321     Speech Stop Time: 1336     Speech Total (min): 15 min       TREATMENT BILLABLE MINUTES:  Eval Swallow and Oral Function 15 and Total Time 15    Diagnosis: Pancytopenia  Dysphagia    Past Medical History:   Diagnosis Date    Arthritis     Cancer     Coronary artery disease     Dementia     Fractures     lt humerus    Hypertension     Schizophrenia     Thyroid disease      Past Surgical History:   Procedure Laterality Date    APPENDECTOMY      colon mass removal    tonsillectomy         Has the patient been evaluated by SLP for swallowing? : Yes  Keep patient NPO?: No   General Precautions: Standard, fall        Social Hx: NH resident.    Prior diet: "chopped/puree" with nectar thick liquids per EMR.    CXR:  "Findings: There is moderate cardiomegaly.  Aorta is tortuous and calcified.  Central pulmonary vasculature is mildly prominent with mild bilateral perihilar and basilar congestion.  No peripheral consolidation or pleural effusion.  Degenerative changes in the spine and shoulder girdle with chronic posttraumatic changes suggested proximal left humerus."    Subjective:  "I eat cat food." Pleasantly confused. Alert and cooperative. Requesting ham and cheese sandwich. Drinking thin coffee. Open mouth posture    Objective: Pt seen for clinical swallowing evaluation. Pt admitted from NH with c/o fever and possible PNA       Oral Musculature Evaluation  Oral Musculature: WFL  Dentition: edentulous  Mucosal Quality: good  Mandibular Strength and Mobility: WFL  Oral Labial Strength and Mobility: impaired retraction, WFL (on L; mild)  Lingual Strength and Mobility: WFL  Velar Elevation: WFL  Volitional Cough: effective  Volitional Swallow: " "timely. laryngeal elevation appears adequate for airway protection upon palpation  Voice Prior to PO Intake: clear; dysarthric     Bedside Swallow Eval:  Consistencies Assessed: Thin liquids via tsp, Nectar thick liquids via tsp, cup and straw, Puree via tsp and Soft solids (chicken salad sandwich and peaches)  Oral Phase: mouth breathing and spitting out of food/liquid  Pharyngeal Phase: coughing/choking    Assessment:  Pham Weinberg is a 77 y.o. female with a medical diagnosis of Pancytopenia and presents with h/o dysphagia per chart and continued s/s pharyngeal dysphagia with thin liquids. Pt given varying amounts of thin and nectar thick liquids, applesauce, chicken salad sandwich and peaches. She demonstrated reactive cough on 1 of 2 trials of thin liquids via tsp. All other PO trials tolerated with no overt s/s penetration/aspraition. She tolerated chicken salad with functional oral phase, however, she eventually requested to expectorate peaches stating "I can't scramble these." No dentures found at bedside. REC Dysphagia Diet level 1, 2/2 poor dentition, with NECTAR thick liquids (premorbid). Will follow.     Goals:    SLP Goals        Problem: SLP Goal    Goal Priority Disciplines Outcome   SLP Goal     SLP    Description:    1) Pt will tolerate current diet with no overt s/s swallow dysfunction  2) Thin liquid trials via cup and soft solids with no overt s/s penetration/aspraition and fucntional oral phase                     Plan:   Patient to be seen Therapy Frequency: 3 x/week   Plan of Care expires: 06/30/17  Plan of Care reviewed with: patient  SLP Follow-up?: Yes  SLP - Next Visit Date: 06/24/17      SLP G-Codes  Functional Assessment Tool Used: NOMS  Functional Limitations: Swallowing  Swallow Current Status (): CJ  Swallow Goal Status (): JESSIE Garcia CCC-SLP  06/23/2017            "

## 2017-06-23 NOTE — PLAN OF CARE
Problem: SLP Goal  Goal: SLP Goal    1) Pt will tolerate current diet with no overt s/s swallow dysfunction  2) Thin liquid trials via cup and soft solids with no overt s/s penetration/aspraition and fucntional oral phase    Clinical swallowing evaluation completed. REC Dysphagia Diet level 1 (pureed) with NECTAR thick liquids. Will follow.    Katey Garcia MS, CCC/SLP 6/23/2017

## 2017-06-23 NOTE — SUBJECTIVE & OBJECTIVE
Past Medical History:   Diagnosis Date    Arthritis     Cancer     Coronary artery disease     Dementia     Fractures     lt humerus    Hypertension     Schizophrenia     Thyroid disease        Past Surgical History:   Procedure Laterality Date    APPENDECTOMY      colon mass removal    tonsillectomy         Review of patient's allergies indicates:   Allergen Reactions    Bacitracin     Tuberculin        No current facility-administered medications on file prior to encounter.      Current Outpatient Prescriptions on File Prior to Encounter   Medication Sig    aspirin 81 MG Chew Take 81 mg by mouth once daily.     bimatoprost (LUMIGAN) 0.03 % ophthalmic drops Place 1 drop into both eyes every evening.     levothyroxine (SYNTHROID) 100 MCG tablet Take 100 mcg by mouth before breakfast.     metoprolol tartrate (LOPRESSOR) 25 MG tablet Take 25 mg by mouth 2 (two) times daily.      potassium chloride SA (K-DUR,KLOR-CON) 20 MEQ tablet Take 20 mEq by mouth 2 (two) times daily.      [DISCONTINUED] aripiprazole (ABILIFY) 30 MG tablet Take 30 mg by mouth once daily.      [DISCONTINUED] azithromycin (AZASITE) 1 % ophthalmic solution 1 drop 2 (two) times daily.      [DISCONTINUED] divalproex (DEPAKOTE SPRINKLE) 125 mg capsule Take 125 mg by mouth 2 (two) times daily.      [DISCONTINUED] escitalopram (LEXAPRO) 10 MG tablet Take 10 mg by mouth once daily.      [DISCONTINUED] fluticasone (FLONASE) 50 mcg/actuation nasal spray 1 spray by Nasal route once daily.      [DISCONTINUED] furosemide (LASIX) 40 MG tablet Take 40 mg by mouth 2 (two) times daily.      [DISCONTINUED] lorazepam (ATIVAN) 1 MG tablet Take 1 mg by mouth every 6 (six) hours as needed.      [DISCONTINUED] megestrol (MEGACE) 400 mg/10 mL (40 mg/mL) suspension Take 200 mg by mouth once daily.      [DISCONTINUED] niacin 500 MG tablet Take 500 mg by mouth daily with breakfast.      [DISCONTINUED] pantoprazole (PROTONIX) 20 MG tablet Take 20 mg  by mouth once daily.      [DISCONTINUED] simvastatin (ZOCOR) 40 MG tablet Take 40 mg by mouth every evening.       Family History     None        Social History Main Topics    Smoking status: Unknown If Ever Smoked    Smokeless tobacco: Not on file    Alcohol use No    Drug use: Unknown    Sexual activity: Not on file     Review of Systems   Unable to perform ROS: Dementia     Objective:     Vital Signs (Most Recent):  Temp: 97.7 °F (36.5 °C) (06/22/17 2106)  Pulse: 86 (06/22/17 2106)  Resp: 18 (06/22/17 2106)  BP: (!) 140/65 (06/22/17 2106)  SpO2: 97 % (06/22/17 2106) Vital Signs (24h Range):  Temp:  [97.2 °F (36.2 °C)-97.7 °F (36.5 °C)] 97.7 °F (36.5 °C)  Pulse:  [85-86] 86  Resp:  [18-20] 18  SpO2:  [95 %-97 %] 97 %  BP: (140-150)/(65-66) 140/65        There is no height or weight on file to calculate BMI.    Physical Exam   Constitutional: She appears well-developed and well-nourished.  Non-toxic appearance. She does not have a sickly appearance. She does not appear ill. No distress.   HENT:   Head: Normocephalic and atraumatic.   Right Ear: External ear normal.   Left Ear: External ear normal.   Nose: Nose normal.   Mouth/Throat: Uvula is midline, oropharynx is clear and moist and mucous membranes are normal.   Eyes: Conjunctivae, EOM and lids are normal. Pupils are equal, round, and reactive to light.   Neck: Trachea normal, normal range of motion and full passive range of motion without pain. Neck supple. No JVD present. No thyroid mass present.   Cardiovascular: Normal rate and regular rhythm.    Murmur heard.  Pulmonary/Chest: Effort normal and breath sounds normal.   Abdominal: Soft. Normal appearance and bowel sounds are normal. She exhibits no distension. There is no tenderness.   Musculoskeletal:        Right lower leg: She exhibits edema.        Left lower leg: She exhibits edema.   Neurological: She is alert. She has normal strength. She is disoriented.   Skin: Skin is intact. No cyanosis.    Psychiatric: She has a normal mood and affect. Her behavior is normal. Her speech is tangential. Cognition and memory are impaired.   Nursing note and vitals reviewed.       Significant Labs:   CBC:   Recent Labs  Lab 06/22/17  1821   WBC 3.20*   HGB 8.3*   HCT 25.0*   PLT 89*       Significant Imaging: I have reviewed all pertinent imaging results/findings within the past 24 hours.

## 2017-06-23 NOTE — H&P
[facility] Hospital Medicine  History & Physical    Patient Name: Pham Weinberg  MRN: [id number]  Admission Date: 6/22/2017  Attending Physician: Eden Rodgers MD  Primary Care Provider: Provider Notinsystem       Patient information was obtained from patient, past medical records and ER records.     Subjective:     Principal Problem:Pancytopenia    Chief Complaint:   Chief Complaint   Patient presents with    Fever     sent for fever and possible PNA on Chest XRay at [facility]        HPI: The patient is a 77 y.o. female who is presenting per [facility] for the gradual onset of suspected PNA. HM endorses a fever and recent abnormal CXR that is remarkable for possible L lower pneumonia. The pt now denies any complaints and is in no acute distress. She denies have a cough, HA, CP, or SOB. No other comments or complaints. Pertinent PMHx includes CAD, dementia, HTN and schizophrenia. No pertinent past surgical hx.     In the ED, she was found to have pancytopenia.    Past Medical History:   Diagnosis Date    Arthritis     Cancer     Coronary artery disease     Dementia     Fractures     lt humerus    Hypertension     Schizophrenia     Thyroid disease        Past Surgical History:   Procedure Laterality Date    APPENDECTOMY      colon mass removal    tonsillectomy         Review of patient's allergies indicates:   Allergen Reactions    Bacitracin     Tuberculin        No current facility-administered medications on file prior to encounter.      Current Outpatient Prescriptions on File Prior to Encounter   Medication Sig    aspirin 81 MG Chew Take 81 mg by mouth once daily.     bimatoprost (LUMIGAN) 0.03 % ophthalmic drops Place 1 drop into both eyes every evening.     levothyroxine (SYNTHROID) 100 MCG tablet Take 100 mcg by mouth before breakfast.     metoprolol tartrate (LOPRESSOR) 25 MG tablet Take 25 mg by mouth 2 (two) times daily.      potassium chloride SA  (K-DUR,KLOR-CON) 20 MEQ tablet Take 20 mEq by mouth 2 (two) times daily.      [DISCONTINUED] aripiprazole (ABILIFY) 30 MG tablet Take 30 mg by mouth once daily.      [DISCONTINUED] azithromycin (AZASITE) 1 % ophthalmic solution 1 drop 2 (two) times daily.      [DISCONTINUED] divalproex (DEPAKOTE SPRINKLE) 125 mg capsule Take 125 mg by mouth 2 (two) times daily.      [DISCONTINUED] escitalopram (LEXAPRO) 10 MG tablet Take 10 mg by mouth once daily.      [DISCONTINUED] fluticasone (FLONASE) 50 mcg/actuation nasal spray 1 spray by Nasal route once daily.      [DISCONTINUED] furosemide (LASIX) 40 MG tablet Take 40 mg by mouth 2 (two) times daily.      [DISCONTINUED] lorazepam (ATIVAN) 1 MG tablet Take 1 mg by mouth every 6 (six) hours as needed.      [DISCONTINUED] megestrol (MEGACE) 400 mg/10 mL (40 mg/mL) suspension Take 200 mg by mouth once daily.      [DISCONTINUED] niacin 500 MG tablet Take 500 mg by mouth daily with breakfast.      [DISCONTINUED] pantoprazole (PROTONIX) 20 MG tablet Take 20 mg by mouth once daily.      [DISCONTINUED] simvastatin (ZOCOR) 40 MG tablet Take 40 mg by mouth every evening.       Family History     None        Social History Main Topics    Smoking status: Unknown If Ever Smoked    Smokeless tobacco: Not on file    Alcohol use No    Drug use: Unknown    Sexual activity: Not on file     Review of Systems   Unable to perform ROS: Dementia     Objective:     Vital Signs (Most Recent):  Temp: 97.7 °F (36.5 °C) (06/22/17 2106)  Pulse: 86 (06/22/17 2106)  Resp: 18 (06/22/17 2106)  BP: (!) 140/65 (06/22/17 2106)  SpO2: 97 % (06/22/17 2106) Vital Signs (24h Range):  Temp:  [97.2 °F (36.2 °C)-97.7 °F (36.5 °C)] 97.7 °F (36.5 °C)  Pulse:  [85-86] 86  Resp:  [18-20] 18  SpO2:  [95 %-97 %] 97 %  BP: (140-150)/(65-66) 140/65        There is no height or weight on file to calculate BMI.    Physical Exam   Constitutional: She appears well-developed and well-nourished.  Non-toxic  appearance. She does not have a sickly appearance. She does not appear ill. No distress.   HENT:   Head: Normocephalic and atraumatic.   Right Ear: External ear normal.   Left Ear: External ear normal.   Nose: Nose normal.   Mouth/Throat: Uvula is midline, oropharynx is clear and moist and mucous membranes are normal.   Eyes: Conjunctivae, EOM and lids are normal. Pupils are equal, round, and reactive to light.   Neck: Trachea normal, normal range of motion and full passive range of motion without pain. Neck supple. No JVD present. No thyroid mass present.   Cardiovascular: Normal rate and regular rhythm.    Murmur heard.  Pulmonary/Chest: Effort normal and breath sounds normal.   Abdominal: Soft. Normal appearance and bowel sounds are normal. She exhibits no distension. There is no tenderness.   Musculoskeletal:        Right lower leg: She exhibits edema.        Left lower leg: She exhibits edema.   Neurological: She is alert. She has normal strength. She is disoriented.   Skin: Skin is intact. No cyanosis.   Psychiatric: She has a normal mood and affect. Her behavior is normal. Her speech is tangential. Cognition and memory are impaired.   Nursing note and vitals reviewed.       Significant Labs:   CBC:   Recent Labs  Lab 06/22/17  1821   WBC 3.20*   HGB 8.3*   HCT 25.0*   PLT 89*       Significant Imaging: I have reviewed all pertinent imaging results/findings within the past 24 hours.    Assessment/Plan:     Essential hypertension    Chronic. Stable. Continue home meds.           Type 2 diabetes mellitus with ophthalmic complication, without long-term current use of insulin    Check A1C. Hold oral hypoglycemics. SSI prn.           Dementia without behavioral disturbance    Chronic. Stable. Continue home meds.           Hypothyroidism    Chronic. Stable. TSH normal 1 month ago. Continue current dose of synthroid.           Glaucoma of both eyes    Chronic. Continue home meds.           * Pancytopenia    Acute.  Labs 1 month ago normal. H/H, WBC and platelets decreased. Other indices decreased as well with the exception of elevated monocytes. CXR with ?mediastinal widening per my read.  Consult heme/ onc. Check labs, LDH, haptoglobin, retic count.           VTE Risk Mitigation         Ordered     Medium Risk of VTE  Once      06/22/17 7073        Eden Rodgers MD  Department of Hospital Medicine   [facility]

## 2017-06-24 PROBLEM — I50.31 ACUTE DIASTOLIC HEART FAILURE: Status: ACTIVE | Noted: 2017-01-01

## 2017-06-24 NOTE — UM SECONDARY REVIEW
Physician Advisor External    Level of Care Issue    Approved Observation. Regulatory outpt for 6/23/17... Has d/c order for 6/24/17....

## 2017-06-24 NOTE — PLAN OF CARE
Problem: Patient Care Overview  Goal: Plan of Care Review  Pt very confused. Instead of calling to go to the restroom pt will take her soliled brief off and throw it on  the floor. Pt noted to be ambulating to the restroom by herself. Pt sat up in chair a couple of times. That is when she gets up and wanders in the room. When in bed the alarm is on. Pt is constantly looking for her pants which are soiled. Explained to pt her clothes and shoes were not available.

## 2017-06-24 NOTE — HOSPITAL COURSE
Patient with viral syndrome associated with pancytopenia. Procalition normal and pancytopenia improving. Patient noted to have mild pulmonary edema and Lasix 40 mg IV administered. ECHO completed. ST evaluated and recommendations for Dysphagia Diet 1 implemented. Patient is feeling well and stable for DC.     PE: chest CTA. Tolerating diet.

## 2017-06-24 NOTE — PLAN OF CARE
Problem: Fall Risk (Adult)  Goal: Absence of Falls  Patient will demonstrate the desired outcomes by discharge/transition of care.   Outcome: Outcome(s) achieved Date Met: 06/24/17  Patient free from falls or injuries this hospitalization   Sitter at bedside

## 2017-06-24 NOTE — PLAN OF CARE
06/23/17 2110   Patient Assessment/Suction   Level of Consciousness (AVPU) alert   All Lung Fields Breath Sounds clear   PRE-TX-O2-ETCO2   O2 Device (Oxygen Therapy) room air   SpO2 97 %   Pulse Oximetry Type Intermittent   Pulse 81   Resp 20   Aerosol Therapy   $ Aerosol Therapy Charges PRN treatment not required   Respiratory Treatment Status PRN treatment not required

## 2017-06-24 NOTE — PLAN OF CARE
Problem: Pressure Ulcer Risk (Mane Scale) (Adult,Obstetrics,Pediatric)  Goal: Skin Integrity  Patient will demonstrate the desired outcomes by discharge/transition of care.   Outcome: Outcome(s) achieved Date Met: 06/24/17  Skin integrity maintained

## 2017-06-24 NOTE — PROGRESS NOTES
06/24/17 0828   Patient Assessment/Suction   Level of Consciousness (AVPU) alert   All Lung Fields Breath Sounds clear   PRE-TX-O2-ETCO2   O2 Device (Oxygen Therapy) room air   SpO2 98 %   Pulse Oximetry Type Intermittent   $ Pulse Oximetry - Multiple Charge Pulse Oximetry - Multiple   Aerosol Therapy   $ Aerosol Therapy Charges PRN treatment not required

## 2017-06-24 NOTE — PLAN OF CARE
Problem: Patient Care Overview  Goal: Plan of Care Review  Outcome: Ongoing (interventions implemented as appropriate)  POC reviewed with patient.  No evidence of learning.  Patient to be discharged back to [facility] in stable condition

## 2017-06-24 NOTE — PLAN OF CARE
Discharge planner sent patient clinical information to [facility] to review for discharge today. Sent via [facility] system, awaiting call back from NH. Chelle. CAMERON

## 2017-06-24 NOTE — NURSING
Spoke with poa via phone. Updated on pt. Status. Pt. Still confused, but less anxious since klonipin given. Will cont to guillermo.

## 2017-06-24 NOTE — DISCHARGE SUMMARY
[facility]  Hospital Medicine  Discharge Summary      Patient Name: Pham Weinberg  MRN: [id number]  Admission Date: 6/22/2017  Hospital Length of Stay: 0 days  Discharge Date and Time: 6/24/2017 11:02 AM  Attending Physician: No att. providers found   Discharging Provider: Dorina Cornejo NP  Primary Care Provider: Tosha Lounsystem      HPI:   The patient is a 77 y.o. female who is presenting per [facility] for the gradual onset of suspected PNA. HM endorses a fever and recent abnormal CXR that is remarkable for possible L lower pneumonia. The pt now denies any complaints and is in no acute distress. She denies have a cough, HA, CP, or SOB. No other comments or complaints. Pertinent PMHx includes CAD, dementia, HTN and schizophrenia. No pertinent past surgical hx.     In the ED, she was found to have pancytopenia.    * No surgery found *      Indwelling Lines/Drains at time of discharge:   Lines/Drains/Airways          No matching active lines, drains, or airways        Hospital Course:   Patient with viral syndrome associated with pancytopenia. Procalition normal and pancytopenia improving. Patient noted to have mild pulmonary edema and Lasix 40 mg IV administered. ECHO completed. ST evaluated and recommendations for Dysphagia Diet 1 implemented. Patient is feeling well and stable for DC.     PE: chest CTA. Tolerating diet.      Consults:     Significant Diagnostic Studies: Labs:   BMP:   Recent Labs  Lab 06/22/17  1821 06/24/17  0424   * 158*    138   K 3.8 4.0    105   CO2 24 27   BUN 20 18   CREATININE 1.0 0.9   CALCIUM 8.6* 8.5*    and CMP   Recent Labs  Lab 06/22/17  1821 06/24/17  0424    138   K 3.8 4.0    105   CO2 24 27   * 158*   BUN 20 18   CREATININE 1.0 0.9   CALCIUM 8.6* 8.5*   PROT 6.2  --    ALBUMIN 2.4*  --    BILITOT 0.6  --    ALKPHOS 124  --    AST 36  --    ALT 18  --    ANIONGAP 7* 6*   ESTGFRAFRICA >60 >60   EGFRNONAA  55* >60     Cardiac Graphics: Echocardiogram:   2D echo with color flow doppler:   Results for orders placed or performed during the hospital encounter of 06/22/17   2D echo with color flow doppler   Result Value Ref Range    EF 70 55 - 65    Diastolic Dysfunction No     Est. PA Systolic Pressure 25.6     Tricuspid Valve Regurgitation TRIVIAL        Pending Diagnostic Studies:     None        Final Active Diagnoses:    Diagnosis Date Noted POA    PRINCIPAL PROBLEM:  Pancytopenia [D61.818] 06/22/2017 Yes    Glaucoma of both eyes [H40.9] 06/23/2017 Yes    Hypothyroidism [E03.9] 06/23/2017 Yes    Dementia without behavioral disturbance [F03.90] 06/23/2017 Yes    Type 2 diabetes mellitus with ophthalmic complication, without long-term current use of insulin [E11.39] 06/23/2017 Yes    Essential hypertension [I10] 06/23/2017 Yes    Acute diastolic heart failure [I50.31] 06/23/2017 Yes      Problems Resolved During this Admission:    Diagnosis Date Noted Date Resolved POA      No new Assessment & Plan notes have been filed under this hospital service since the last note was generated.  Service: Hospital Medicine      Discharged Condition: stable    Disposition: Nursing Facility    Follow Up:  Follow-up Information     Primary care provider In 1 week.    Why:  hospital follow up           [facility]    Specialties:  SNF Agency, Allergy, Allergy  Why:  Nursing Home               Patient Instructions:     Diet general   Order Specific Question Answer Comments   Additional restrictions: Cardiac (Low Na/Chol)      Activity as tolerated     Call MD for:  increased confusion or weakness     Call MD for:  temperature >100.4       Medications:  Reconciled Home Medications:   Discharge Medication List as of 6/24/2017  9:31 AM      CONTINUE these medications which have NOT CHANGED    Details   acetaminophen (TYLENOL) 325 MG tablet Take 650 mg by mouth every 6 (six) hours as needed for Pain., Historical Med       albuterol-ipratropium 2.5mg-0.5mg/3mL (DUONEB) 0.5 mg-3 mg(2.5 mg base)/3 mL nebulizer solution Take 3 mLs by nebulization every 6 (six) hours as needed for Wheezing or Shortness of Breath. Rescue, Historical Med      aspirin 81 MG Chew Take 81 mg by mouth once daily. , Historical Med      bimatoprost (LUMIGAN) 0.03 % ophthalmic drops Place 1 drop into both eyes every evening. , Historical Med      !! clonazePAM (KLONOPIN) 0.5 MG tablet Take 0.5 mg by mouth every evening., Historical Med      !! clonazePAM (KLONOPIN) 1 MG tablet Take 1 mg by mouth every morning., Historical Med      docusate sodium (COLACE) 100 MG capsule Take 100 mg by mouth 2 (two) times daily., Historical Med      donepezil (ARICEPT) 10 MG tablet Take 10 mg by mouth every evening., Historical Med      ferrous sulfate 325 (65 FE) MG EC tablet Take 325 mg by mouth 2 (two) times daily., Historical Med      Lactobacillus acidophilus (ACIDOPHILUS) Cap Take 2 capsules by mouth 2 (two) times daily., Historical Med      lactulose (CHRONULAC) 20 gram/30 mL Soln Take 20 g by mouth 2 (two) times daily., Historical Med      levothyroxine (SYNTHROID) 100 MCG tablet Take 100 mcg by mouth before breakfast. , Historical Med      loratadine (CLARITIN) 10 mg tablet Take 10 mg by mouth daily as needed for Allergies., Historical Med      magnesium hydroxide, CONCENTRATE, (MILK OF MAGNESIA CONCENTRATED) 2,400 mg/10 mL Susp Take 10 mLs by mouth once a week. Wednesday, Historical Med      magnesium oxide (MAG-OX) 400 mg tablet Take 400 mg by mouth once daily., Historical Med      metformin (GLUCOPHAGE) 1000 MG tablet Take 1,000 mg by mouth 2 (two) times daily with meals., Historical Med      metoprolol tartrate (LOPRESSOR) 25 MG tablet Take 25 mg by mouth 2 (two) times daily.  , Until Discontinued, Historical Med      olanzapine (ZYPREXA) 10 MG tablet Take 10 mg by mouth 2 (two) times daily., Historical Med      omeprazole (PRILOSEC) 20 MG capsule Take 20 mg by mouth  once daily., Historical Med      oxybutynin (DITROPAN-XL) 5 MG TR24 Take 5 mg by mouth once daily., Historical Med      potassium chloride SA (K-DUR,KLOR-CON) 20 MEQ tablet Take 20 mEq by mouth 2 (two) times daily.  , Until Discontinued, Historical Med      sertraline (ZOLOFT) 50 MG tablet Take 50 mg by mouth once daily., Historical Med       !! - Potential duplicate medications found. Please discuss with provider.        Time spent on the discharge of patient: 38 minutes    Dorina Cornejo NP  Department of Hospital Medicine  [facility]

## 2017-07-26 PROBLEM — D64.9 SYMPTOMATIC ANEMIA: Status: ACTIVE | Noted: 2017-01-01

## 2017-07-26 PROBLEM — J96.02 ACUTE RESPIRATORY FAILURE WITH HYPOXIA AND HYPERCARBIA: Status: ACTIVE | Noted: 2017-01-01

## 2017-07-26 PROBLEM — J96.01 ACUTE RESPIRATORY FAILURE WITH HYPOXIA AND HYPERCARBIA: Status: ACTIVE | Noted: 2017-01-01

## 2017-07-26 PROBLEM — F20.9 SCHIZOPHRENIA: Status: ACTIVE | Noted: 2017-01-01

## 2017-07-26 PROBLEM — R09.02 HYPOXIA: Status: ACTIVE | Noted: 2017-01-01

## 2017-07-26 PROBLEM — F05 ACUTE CONFUSIONAL STATE: Status: ACTIVE | Noted: 2017-01-01

## 2017-07-26 NOTE — ED NOTES
EMS report states they were called for pt for psychiatric reasons. On arrival pt 02 sat in mid 80s. Altered mental status. Pt is usually confused by alert and awake.

## 2017-07-26 NOTE — PROGRESS NOTES
07/26/17 1520   Patient Assessment/Suction   Level of Consciousness (AVPU) responds to voice   PRE-TX-O2-ETCO2   O2 Device (Oxygen Therapy) BiPAP   $ Is the patient on Oxygen? Yes   SpO2 98 %   Pulse Oximetry Type Continuous   $ Pulse Oximetry - Multiple Charge Pulse Oximetry - Multiple   Pulse 97   Preset CPAP/BiPAP Settings   Mode Of Delivery BiPAP   $ CPAP/BiPAP Daily Charge BiPAP/CPAP Daily   $ Initial CPAP/BiPAP Setup? Yes   $ Is patient using? Yes   Equipment Type V60   Airway Device Type medium full face mask   Ipap 12   EPAP (cm H2O) 8   Pressure Support (cm H2O) 4   Set Rate (Breaths/Min) 10   ITime (sec) 1   Rise Time (sec) 2   Patient CPAP/BiPAP Settings   Timed Inspiration (Sec) 1   IPAP Rise Time (sec) 2   RR Total (Breaths/Min) 19   Tidal Volume (mL) 385   Peak Inspiratory Pressure (cm H2O) 12   TiTOT (%) 30   Total Leak (L/Min) 26   Patient Trigger - ST Mode Only (%) 82   CPAP/BiPAP Alarms   High Pressure (cm H2O) 17   Low Pressure (cm H2O) 7   Minute Ventilation (L/Min) 3   High RR (breaths/min) 40   Low RR (breaths/min) 8   Apnea (Sec) 20   Labs   $ Was an ABG obtained? ISTAT - Blood gas   $ Labs Tech Time 15 min   Critical Value Communication   Notified Physician/Designee Dr. Carlos   Date Result Received 07/26/17   Time Result Received 1520   Resulting Department of Critical Value Resp   Who communicated critical value from resulting department? LSerpas RRT   Critical Test #1 CO2   Critical Test #1 Result 62   Date Notified 07/26/17   Time Notified 1520

## 2017-07-26 NOTE — PROGRESS NOTES
The enoxaparin dose has been adjusted for Pham Lenard [id number] according to the pharmacy practice protocol.      Based on the patient's Estimated Creatinine Clearance: 43.1 mL/min (based on Cr of 1.1)., Body mass index is 28.34 kg/m²., and weight= 74.9 kg (165 lb 2 oz), the enoxaparin dose has been adjusted 40 mg every 24 hours.    Thank you,  Yolande Yang

## 2017-07-26 NOTE — ED PROVIDER NOTES
Encounter Date: 7/26/2017    SCRIBE #1 NOTE: I, Pauline Robertson, am scribing for, and in the presence of, Dr Carlos.       History     Chief Complaint   Patient presents with    Shortness of Breath    Altered Mental Status     07/26/2017  2:08 PM     Chief Complaint: SOB      Pham Weinberg is a 77 y.o. female with a pmhx of Cancer; CAD; Dementia; HTN; Schizophrenia; and Thyroid disease presenting to the E.D. Via EMS from [facility] for SOB. Per EMS, staff at [facility] were attempting to take vitals when O2 saturation in the 50s. Staff also notes pt has been confused over the past several days as well. HPI limited. Pt has a past surgical history that includes tonsillectomy and Appendectomy.        The history is provided by the patient.     Review of patient's allergies indicates:   Allergen Reactions    Bacitracin     Tuberculin      Past Medical History:   Diagnosis Date    Arthritis     Cancer     Coronary artery disease     Dementia     Fractures     lt humerus    Hypertension     Schizophrenia     Thyroid disease      Past Surgical History:   Procedure Laterality Date    APPENDECTOMY      colon mass removal    tonsillectomy       Family History   Problem Relation Age of Onset    Anesthesia problems Neg Hx     Clotting disorder Neg Hx      Social History   Substance Use Topics    Smoking status: Unknown If Ever Smoked    Smokeless tobacco: Never Used    Alcohol use No     Review of Systems   Unable to perform ROS: Other       Physical Exam     Initial Vitals   BP Pulse Resp Temp SpO2   -- -- -- -- --      MAP       --         Physical Exam    Nursing note and vitals reviewed.  Constitutional: She appears well-developed and well-nourished.   HENT:   Head: Normocephalic and atraumatic.   Mouth/Throat: Oropharynx is clear and moist.   Eyes: Conjunctivae are normal.   Neck: Neck supple. JVD (mild) present.   Cardiovascular: Normal rate, regular rhythm, normal heart sounds and intact  distal pulses. Exam reveals no gallop and no friction rub.    No murmur heard.  Pulmonary/Chest: She has no wheezes. She has no rhonchi. She has rales.   Rales at the bases.    Abdominal: Soft. She exhibits no distension. There is no tenderness.   Musculoskeletal: Normal range of motion.   1+ pitting edema to lower extremities. Legs symmetric and non tender. Moving all extremities.      Neurological: She is alert.   Neuro exam limited based on pt status.    Skin: No rash noted. No erythema.   Psychiatric: She has a normal mood and affect.         ED Course   Critical Care  Date/Time: 7/26/2017 6:26 PM  Performed by: YEMI REGALADO  Authorized by: STEWART FOURNIER   Direct patient critical care time: 21 minutes  Additional history critical care time: 3 minutes  Ordering / reviewing critical care time: 6 minutes  Documentation critical care time: 5 minutes  Consulting other physicians critical care time: 4 minutes  Total critical care time (exclusive of procedural time) : 39 minutes        Labs Reviewed   CBC W/ AUTO DIFFERENTIAL - Abnormal; Notable for the following:        Result Value    RBC 2.13 (*)     Hemoglobin 6.3 (*)     Hematocrit 19.8 (*)     MCHC 31.8 (*)     RDW 19.6 (*)     Platelets 131 (*)     Gran% 75.4 (*)     Lymph% 13.6 (*)     All other components within normal limits    Narrative:     H & H   critical result(s) called and verbal readback obtained from   Brigitte Smith, 07/26/2017 14:57   BASIC METABOLIC PANEL - Abnormal; Notable for the following:     Glucose 111 (*)     Calcium 8.6 (*)     eGFR if  56 (*)     eGFR if non  49 (*)     All other components within normal limits   ISTAT PROCEDURE - Abnormal; Notable for the following:     POC PCO2 61.7 (*)     POC PO2 34 (*)     POC HCO3 34.6 (*)     POC SATURATED O2 61 (*)     POC TCO2 36 (*)     All other components within normal limits              Imaging Results          X-Ray Chest 1 View (Final result)   Result time 07/26/17 15:07:39    Final result by Carlos Jacob Jr., MD (07/26/17 15:07:39)                 Impression:     Moderate cardiomegaly increased from prior study.  Prominence of the pulmonary vasculature and faint infiltrates at the lung bases may represent pulmonary edema.      Electronically signed by: Carlos Jacob MD  Date:     07/26/17  Time:    15:07              Narrative:    Calcification is noted within the aorta.  There is moderate cardiomegaly a significant increase since the prior study of 06/22/2017.  There is prominence of the pulmonary vasculature are and small infiltrates noted at the lung bases with pulmonary edema not ruled out.  No pneumothorax is seen.                             CT Head Without Contrast (Final result)  Result time 07/26/17 15:04:50    Final result by Carlos Jacob Jr., MD (07/26/17 15:04:50)                 Impression:     Negative Head CT.      Electronically signed by: Carlos Jacob MD  Date:     07/26/17  Time:    15:04              Narrative:    A Axial images are obtained through the cranium and displayed in bone and brain windows.     The basal cisterns are clear and symmetric.  There is no mass-effect or midline shift.  The ventricles are of normal size and symmetric.  The gray-white matter differentiation is normal.      Within the brain parenchymal no hyper or hypo-dense lesions consistent with tumor, edema, CVA or hemorrhage are seen.  No intracranial hemorrhage or hematoma is noted.                                (radiology reading, visualized by me)          Scribe Attestation:   Scribe #1: I performed the above scribed service and the documentation accurately describes the services I performed. I attest to the accuracy of the note.    Attending Attestation:           Physician Attestation for Scribe:  Physician Attestation Statement for Scribe #1: I, Dr Carlos, reviewed documentation, as scribed by Pauline jaramillo in my presence, and it is  both accurate and complete.         Pham Weinberg is a 77 y.o. female presenting with altered mental status likely secondary to hypoxia and hypercapnia stemming from respiratory failure due to CHF with pulmonary edema along with new anemia.  Etiology of anemia is unclear.  Subsequent rectal examination performed with ED tech shows positive stool slightly guaiac positive.  No sign of obvious blood visualized.  Life-threatening GI bleed unlikely.  Transfusion initiated given hypoxia and anemia that is new compared to prior values.  Patient unable to consent secondary to mental status and blood administered on emergency basis in her best interest.  BiPAP initiated for respiratory issues.  Mental status is adequate to tolerate BiPAP with low concern for aspiration.  I do not think intubation is necessary at this point.  I doubt ACS.  I do not think further cardiac biomarker is indicated.  Low suspicion for PE.  Further diuresis at hospitalist team discretion.  I have spoke with Dr. Wilkinson from the hospitalist service who will assume care in ICU.        ED Course   Comment By Time   EKG:  Normal sinus rhythm at a rate of 99.  Normal intervals.  Normal axis.  No significant ST or T wave changes suggesting acute ischemia or infarction. Driss Carlos MD 07/26 8077     Clinical Impression:   The primary encounter diagnosis was Hypoxia. Diagnoses of Pulmonary edema cardiac cause, Acute on chronic congestive heart failure, unspecified congestive heart failure type, and Confusion were also pertinent to this visit.                           Driss Carlos MD  07/26/17 2354

## 2017-07-26 NOTE — H&P
PCP: [facility]    History & Physical    Chief Complaint: Altered mental status    History of Present Illness:  Patient is a 77 y.o. female admitted to Hospitalist Service from [facility] in ICU with complaint of altered mental status. Patient reportedly has past medical history significant for dementia, Schizophrenia, hypertension, hypothyroidism, CAD and unspecified cancer history. Per nursing home papers, patient is DNR. Patient is a resident of [facility]. Patient presented with EMS with complaint of altered mental status and SOB. Patient is not able to communicate. No family present at this time. Reportedly, patient's oxygen sats were in 50s. Reportedly, patient had been confused at nursing home for several days and she was accepted a a local psych facility for management but noted to have low sats today. Presently, patient is minimally responsive and have been placed on BiPAP and is going to ICU for further management. Patient was last month hospitalized for pneumonia.    Past Medical History:   Diagnosis Date    Arthritis     Cancer     Coronary artery disease     Dementia     Fractures     lt humerus    Hypertension     Schizophrenia     Thyroid disease      Past Surgical History:   Procedure Laterality Date    APPENDECTOMY      colon mass removal    tonsillectomy       Family History   Problem Relation Age of Onset    Anesthesia problems Neg Hx     Clotting disorder Neg Hx      Social History   Substance Use Topics    Smoking status: Unknown If Ever Smoked    Smokeless tobacco: Never Used    Alcohol use No      Review of patient's allergies indicates:   Allergen Reactions    Bacitracin     Tuberculin     Tuberculin julianne test ppd      PTA Medications   Medication Sig    acetaminophen (TYLENOL) 325 MG tablet Take 650 mg by mouth every 6 (six) hours as needed for Pain.    albuterol-ipratropium 2.5mg-0.5mg/3mL (DUONEB) 0.5 mg-3 mg(2.5 mg base)/3 mL  nebulizer solution Take 3 mLs by nebulization every 6 (six) hours as needed for Wheezing or Shortness of Breath. Rescue    aspirin 81 MG Chew Take 81 mg by mouth once daily.     bimatoprost (LUMIGAN) 0.03 % ophthalmic drops Place 1 drop into both eyes every evening.     clonazePAM (KLONOPIN) 0.5 MG tablet Take 0.5 mg by mouth every evening.    clonazePAM (KLONOPIN) 1 MG tablet Take 1 mg by mouth every morning.    docusate sodium (COLACE) 100 MG capsule Take 100 mg by mouth 2 (two) times daily.    donepezil (ARICEPT) 10 MG tablet Take 10 mg by mouth every evening.    ferrous sulfate 325 (65 FE) MG EC tablet Take 325 mg by mouth 2 (two) times daily.    furosemide (LASIX) 40 MG tablet Take 40 mg by mouth once daily.    Lactobacillus acidophilus (ACIDOPHILUS) Cap Take 2 capsules by mouth 2 (two) times daily.    lactulose (CHRONULAC) 20 gram/30 mL Soln Take 20 g by mouth 2 (two) times daily.    levothyroxine (SYNTHROID) 100 MCG tablet Take 100 mcg by mouth before breakfast.     loratadine (CLARITIN) 10 mg tablet Take 10 mg by mouth daily as needed for Allergies.    magnesium hydroxide, CONCENTRATE, (MILK OF MAGNESIA CONCENTRATED) 2,400 mg/10 mL Susp Take 10 mLs by mouth once a week. Wednesday    magnesium oxide (MAG-OX) 400 mg tablet Take 400 mg by mouth once daily.    metformin (GLUCOPHAGE) 1000 MG tablet Take 1,000 mg by mouth 2 (two) times daily with meals.    metoprolol tartrate (LOPRESSOR) 25 MG tablet Take 25 mg by mouth 2 (two) times daily.      olanzapine (ZYPREXA) 10 MG tablet Take 10 mg by mouth 2 (two) times daily.    omeprazole (PRILOSEC) 20 MG capsule Take 20 mg by mouth once daily.    oxybutynin (DITROPAN-XL) 5 MG TR24 Take 5 mg by mouth once daily.    potassium chloride SA (K-DUR,KLOR-CON) 20 MEQ tablet Take 20 mEq by mouth 2 (two) times daily.      quetiapine (SEROQUEL) 100 MG Tab Take 150 mg by mouth every evening.    sertraline (ZOLOFT) 50 MG tablet Take 50 mg by mouth once daily.      Review of Systems: Unable to obtain due to patient's present medical condition     OBJECTIVE:     Vital Signs (Most Recent)  Temp: 98.4 °F (36.9 °C) (07/26/17 1647)  Pulse: 97 (07/26/17 1700)  Resp: 18 (07/26/17 1700)  BP: (!) 141/62 (07/26/17 1700)  SpO2: 98 % (07/26/17 1700)    Physical Exam:  General appearance: well developed, appears stated age, on BiPAP  Head: normocephalic, atraumatic  Eyes:  conjunctivae/corneas clear. PERRL.  Nose: Nares normal. Septum midline.  Throat: lips, mucosa, and tongue normal; teeth and gums normal, no throat erythema.  Neck: supple, symmetrical, trachea midline, + JVD and thyroid not enlarged, symmetric, no tenderness/mass/nodules  Lungs: Fine bi-basal rales  Chest wall: no tenderness  Heart: regular rate and rhythm, S1, S2 normal, no murmur, click, rub or gallop  Abdomen: soft, non-tender non-distented; bowel sounds normal; no masses,  no organomegaly  Extremities: no cyanosis, clubbing, 1+ edema.   Pulses: 2+ and symmetric  Skin: Skin color, texture, turgor normal. No rashes or lesions.  Lymph nodes: Cervical, supraclavicular, and axillary nodes normal.  Neurologic: Not responsive, on BiPAP. Tries to follow commands.    Laboratory:   CBC:   Recent Labs  Lab 07/26/17  1432   WBC 9.40   RBC 2.13*   HGB 6.3*   HCT 19.8*   *   MCV 93   MCH 29.6   MCHC 31.8*     CMP:   Recent Labs  Lab 07/26/17  1432   *   CALCIUM 8.6*      K 4.0   CO2 24      BUN 23   CREATININE 1.1     ABGs:   Recent Labs  Lab 07/26/17  1503   PH 7.357   PCO2 61.7*   PO2 34*   HCO3 34.6*   POCSATURATED 61*   BE 9     Microbiology Results (last 7 days)     ** No results found for the last 168 hours. **        No results for input(s): COLORU, CLARITYU, SPECGRAV, PHUR, PROTEINUA, GLUCOSEU, BILIRUBINCON, BLOODU, WBCU, RBCU, BACTERIA, MUCUS, NITRITE, LEUKOCYTESUR, UROBILINOGEN, HYALINECASTS in the last 168 hours.    Hemoglobin A1C   Date Value Ref Range Status   06/23/2017 5.2 4.0 - 5.6 %  Final     Comment:     According to ADA guidelines, hemoglobin A1c <7.0% represents  optimal control in non-pregnant diabetic patients. Different  metrics may apply to specific patient populations.   Standards of Medical Care in Diabetes-2016.  For the purpose of screening for the presence of diabetes:  <5.7%     Consistent with the absence of diabetes  5.7-6.4%  Consistent with increasing risk for diabetes   (prediabetes)  >or=6.5%  Consistent with diabetes  Currently, no consensus exists for use of hemoglobin A1c  for diagnosis of diabetes for children.  This Hemoglobin A1c assay has significant interference with fetal   hemoglobin   (HbF). The results are invalid for patients with abnormal amounts of   HbF,   including those with known Hereditary Persistence   of Fetal Hemoglobin. Heterozygous hemoglobin variants (HbAS, HbAC,   HbAD, HbAE, HbA2) do not significantly interfere with this assay;   however, presence of multiple variants in a sample may impact the %   interference.       Microbiology Results (last 7 days)     ** No results found for the last 168 hours. **        Diagnostic Results:  Chest X-Ray: Moderate cardiomegaly increased from prior study.  Prominence of the pulmonary vasculature and faint infiltrates at the lung bases may represent pulmonary edema.    CT head: Negative Head CT.    ECHO (06-23-17):    1 - Eccentric hypertrophy.     2 - No wall motion abnormalities.     3 - Hyperdynamic left ventricular systolic function (EF 65-70%).     4 - Normal left ventricular diastolic function.     5 - Right ventricular enlargement with hyperdynamic systolic function.     6 - The estimated PA systolic pressure is greater than 26 mmHg.     7 - Difficult windows.     Assessment/Plan:     Active Hospital Problems    Diagnosis  POA    *Acute respiratory failure with hypoxia and hypercarbia [J96.01, J96.02]  Yes      Yes    Acute confusional state - likely metbolic encephalopathy [F05]  Supportive  care.  Neurochecks.  Hopefully with correction of hypoxia and hypercarbia, mental will improve.  avodi psychotropic medications.    Yes    Schizophrenia [F20.9]  Closely monitor mental status.  Psychitric medications are on for now.    Yes    Hypothyroidism [E03.9]  Chronic problem. Will continue chronic medications and monitor for any changes, adjusting as needed.  Check TSH.    Yes    Acute diastolic heart failure [I50.31]  Supplemental O2 via nasal canula; titrate O2 saturation to >92%.  Serial Cardiac enzymes × 3.  Lasix 20 mg IV q 12 hrs. Monitor electrolytes for hypokalemia and hypomagnesemia.  Cardiology Consultation.  2-D Echocardiogram from  reviewed.  Daily weights.  Strict I/Os.  Fluid restriction.  Na restriction <2g/d.    Yes    Type 2 diabetes mellitus with ophthalmic complication, without long-term current use of insulin [E11.39]  Check blood glucose level q 6.  Use Novolog Insulin Sliding Scale as needed.   Continue American Diabetic Association 1800 Kcal diet.    Yes    Dementia without behavioral disturbance [F03.90]  Dementia is controlled currently. Continue home dementia meds and non-pharmacologic interventions to prevent delirium (No VS between 11PM-5AM, activity during day, opening blinds, providing glasses/hearing aids, and up in chair during daytime). Use PRN anti-psychotics to prevent behavior of self harm during sundowning, and avoid narcotics and benzos unless absolutely necessary. PRN anti-psychotics prescribed to avoid self harm behaviors.    Yes    Symptomatic anemia [D64.9]  Transfuse 2 units of PRBC.  Check Iron, TIBC, B12, Folate and FOBT.  Follow CBC.    Yes      Code status : DNR.    VTE Risk Mitigation         Ordered     enoxaparin injection 40 mg  Daily     Route:  Subcutaneous        07/26/17 1647     Medium Risk of VTE  Once      07/26/17 1647        Ara Wilkinson MD  Department of Hospital Medicine   [facility]

## 2017-07-26 NOTE — ED TRIAGE NOTES
Patient from [facility].  EMS called for AMS, on arrival was told that patient was found with sats in the 50's.  Sats were 87 when they got there.  O2 applied and sats increased to 100%.  Patient was non verbal until arrival to hospital.

## 2017-07-26 NOTE — NURSING
Pt received from ER, on bipap.  Responds to voice and follows simple commands.  Attached to monitor and admit assessment done.

## 2017-07-27 PROBLEM — J69.0 ASPIRATION PNEUMONIA: Status: ACTIVE | Noted: 2017-01-01

## 2017-07-27 PROBLEM — J81.0 ACUTE PULMONARY EDEMA: Status: ACTIVE | Noted: 2017-01-01

## 2017-07-27 PROBLEM — J95.84 TRALI (TRANSFUSION RELATED ACUTE LUNG INJURY): Status: ACTIVE | Noted: 2017-01-01

## 2017-07-27 NOTE — CONSULTS
Dictated.  Left pleural effusion. BNP. ? Aspiration pneumonia. Wet cough - feeble expectoration  Anemia.

## 2017-07-27 NOTE — PLAN OF CARE
Problem: SLP Goal  Goal: SLP Goal    1) Pt will participate in ongoing swallow assessment    Clinical swallowing evaluation completed. Pt not appropriate for PO at this time. REC NPO, including meds. Will continue to follow for ongoing swallowing assessment.     Katey Garcia MS, CCC/SLP 7/27/2017

## 2017-07-27 NOTE — PLAN OF CARE
Problem: Patient Care Overview  Goal: Plan of Care Review  Outcome: Ongoing (interventions implemented as appropriate)  Pt rec prn duoneb treatments with O2 via 6 lpm nc alternating with bipap. BS diminished derek with HR 95 bpm.

## 2017-07-27 NOTE — CONSULTS
HISTORY OF PRESENT ILLNESS:  This 77-year-old white lady is transferred from   [facility] with altered mental status and shortness of breath.    The patient was recently in the hospital and was treated for pneumonia and   discharged to [facility].  The patient is unable to give any history.  She   has dementia.  The patient's mental status apparently deteriorated and she   developed increasing shortness of breath.  The patient was severely anemic with   hemoglobin of 6.3 and hematocrit of 19.8.  She received PRBCs 2 units with Lasix   40 mg IV last evening.  H and H have improved to 7.9 and 23.4.  She is   presently receiving PRBCs 2 units again.  The patient has a wet sounding cough,   but no sputum expectoration.  She is mildly short of breath.  The patient had an   echocardiogram during her last admit in June when LVEF was estimated at 65-70%,   normal LV dimensions 47/31 mm, normal LV thickness, and dilated right ventricle   with estimated PA systolic pressure 26 mmHg.  Right ventricular contractility   was good.    PAST HISTORY:  The patient has a history of coronary artery disease.  She has   had a recent left humeral fracture.  Hypertension.  Hypothyroidism.    Schizophrenia and dementia.  Arthritis.  Cancer.  Appendectomy.  Partial   colectomy for mass.  Tonsillectomy.    SOCIAL HISTORY:  The patient does not drink any alcohol.  It is unclear if she   used to smoke.    REVIEW OF SYSTEMS:  The patient has dementia.    MEDICATIONS:  Sertraline 50 mg daily, Seroquel 150 mg q.p.m., acetaminophen 650   mg every 6 hours p.r.n., DuoNeb every 6 hours p.r.n., aspirin 81 mg daily,   Lumigan 0.03% drops  q.p.m., clonazepam 0.5 mg q.p.m. and 1 mg in a.m., Colace   100 mg b.i.d., Aricept 10 mg q.p.m., ferrous sulfate 325 mg b.i.d., furosemide   40 mg daily, lactobacillus 2 capsules b.i.d., lactulose 20 grams b.i.d.,   levothyroxine 100 mcg q.a.m., loratadine 10 mg p.r.n., milk of magnesia weekly,   Mag-Ox 400 mg  daily, metformin 1000 mg b.i.d., metoprolol 25 mg b.i.d., Zyprexa   10 mg b.i.d., omeprazole 20 mg daily, Ditropan XL 5 mg daily, and potassium 20   mEq b.i.d.    PHYSICAL EXAMINATION:  GENERAL:  This is a well-built white lady with a wet sounding cough, but no   expectoration.  She is not obviously short of breath.  HEENT:  Conjunctival pallor is noted.  There is no scleral icterus.  THROAT:  No masses.  NECK:  Jugular venous pressure is normal to mildly elevated, but jugular reflex   is positive.  Carotids without bruits.  CHEST:  Coarse extensive upper airway sounds are audible.  There is diminished   air entry in the right base.  HEART:  S1, S2 well heard.  There are no murmurs, gallops or rubs.  Coarse upper   airway sounds are audible the anterior chest again.  ABDOMEN:  Soft.  Liver is not palpable.  EXTREMITIES:  No clubbing, cyanosis or edema.    IMPRESSION:  1.  Altered mental status; hypoxia; right pleural effusion - possible CHF;   probable aspiration pneumonia.  2.  Anemia; PRBCs - 2 units on 07/26/2017.  3.  Hypothyroidism; diabetes mellitus; dementia.    PLAN:  The patient will receive 2 units of PRBCs today, preceded by Lasix 20 mg   IV.  BNP today.  Nasotracheal suction p.r.n.  Further management will depend on   the patient's clinical course.      ALPA  dd: 07/27/2017 13:43:22 (CDT)  td: 07/27/2017 15:11:29 (CDT)  Doc ID   #[id number]  Job ID #[id number]    CC:

## 2017-07-27 NOTE — CONSULTS
"  07/27/2017      Admit Date: 7/26/2017  Pham Lenard  New Patient Consult    Chief Complaint   Patient presents with    Shortness of Breath    Altered Mental Status       History of Present Illness:  Pt unable to supplement history. Pt was in NSR June with relatively clear cxr and pancytopenia on chr anti psychotics.  bnp was 1700 at that time and echo was good.  Pt represents with sats in 50% range and very low hgb.  Pt requires niv/bpap post/during transfusion.  cxr worsens.  Pt agitated intermittently . Unable to pull up [facility] web site. Pt has some sort cancer.       PFSH:  Past Medical History:   Diagnosis Date    Arthritis     Cancer     Coronary artery disease     Dementia     Fractures     lt humerus    Hypertension     Schizophrenia     Thyroid disease      Past Surgical History:   Procedure Laterality Date    APPENDECTOMY      colon mass removal    tonsillectomy       Social History   Substance Use Topics    Smoking status: Unknown If Ever Smoked    Smokeless tobacco: Never Used    Alcohol use No     Family History   Problem Relation Age of Onset    Anesthesia problems Neg Hx     Clotting disorder Neg Hx      Review of patient's allergies indicates:   Allergen Reactions    Bacitracin     Tuberculin     Tuberculin julianne test ppd           Review of Systems:  due to neurologic status/impairments a Review of Systems could not be obtained       Exam:Comprehensive exam done. BP (!) 151/68   Pulse 96   Temp 99.2 °F (37.3 °C) (Axillary)   Resp (!) 34   Ht 5' 4" (1.626 m)   Wt 74.9 kg (165 lb 2 oz)   SpO2 (!) 90%   Breastfeeding? No   BMI 28.34 kg/m²   Exam included Vitals as listed, and patient's appearance and affect and alertness and mood, oral exam for yeast and hygiene and pharynx lesions and Mallapatti (M) score, neck with inspection for jvd and masses and thyroid abnormalities and lymph nodes (supraclavicular and infraclavicular nodes also examined and noted if abn), chest exam " included symmetry and effort and fremitus and percussion and auscultation, cardiac exam included rhythm and gallops and murmur and rubs and jvd and edema, abdominal exam for mass and hepatosplenomegaly and tenderness and hernias and bowel sounds, Musculoskeletal exam with muscle tone and posture and mobility/gait and  strenght, and skin for rashes and cyanosis and pallor and turgor, extremity for clubbing.  Findings were normal except as listed below:  On niv with full face mask, no jvd but has some flank edema, no distress but min rales, nl fremitus  And percussion, nl cor but distent.  No clubbing,     Radiographs reviewed: view by direct vision  cxr coarse markings in June and now r>> edema and infiltrates  Results for orders placed during the hospital encounter of 07/26/17   X-Ray Chest 1 View    Narrative Calcification is noted within the aorta.  There is moderate cardiomegaly a significant increase since the prior study of 06/22/2017.  There is prominence of the pulmonary vasculature are and small infiltrates noted at the lung bases with pulmonary edema not ruled out.  No pneumothorax is seen.    Impression  Moderate cardiomegaly increased from prior study.  Prominence of the pulmonary vasculature and faint infiltrates at the lung bases may represent pulmonary edema.      Electronically signed by: Carlos Jacob MD  Date:     07/26/17  Time:    15:07    ]    Labs       Recent Labs  Lab 07/27/17  0646   WBC 6.70   HGB 7.9*   HCT 23.4*        Recent Labs  Lab 07/27/17  0008 07/27/17  0646   NA  --  141   K  --  3.3*   CL  --  99   CO2  --  31*   BUN  --  18   CREATININE  --  0.9   GLU  --  106   CALCIUM  --  8.1*   TROPONINI 0.081*  --    CPK 61 44   CPKMB 1.9 1.3   MB 3.1 3.0   No results for input(s): PH, PCO2, PO2, HCO3 in the last 24 hours.  Microbiology Results (last 7 days)     ** No results found for the last 168 hours. **          Impression:  Active Hospital Problems    Diagnosis  POA     *Acute respiratory failure with hypoxia and hypercarbia [J96.01, J96.02]  Yes    TRALI (transfusion related acute lung injury) [J95.84]  No    Acute pulmonary edema [J81.0]  Yes    Aspiration pneumonia [J69.0]  Yes    Hypoxia [R09.02]  Yes    Acute confusional state - likely metbolic encephalopathy [F05]  Yes    Schizophrenia [F20.9]  Yes    Hypothyroidism [E03.9]  Yes    Acute diastolic heart failure [I50.31]  Yes    Type 2 diabetes mellitus with ophthalmic complication, without long-term current use of insulin [E11.39]  Yes    Dementia without behavioral disturbance [F03.90]  Yes    Symptomatic anemia [D64.9]  Yes      Resolved Hospital Problems    Diagnosis Date Resolved POA   No resolved problems to display.               Plan:   Coy is certainly poor,  Survival doubtful.      Lasix dosed.  Check bnp and procalcitonin.  Cont abx. Support.      Old records to establish cancer dx? And history.  Cytopenia could be from antipsycotics?? Dx of anemia not clear but had problem last month.

## 2017-07-27 NOTE — PT/OT/SLP EVAL
"Speech Language Pathology  Evaluation    Pham Weinberg   MRN: [id number]   Admitting Diagnosis: Acute respiratory failure with hypoxia and hypercarbia    Diet recommendations: Solid Diet Level: NPO  Liquid Diet Level: NPO, including meds. Oral care.     SLP Treatment Date: 07/27/17  Speech Start Time: 1131     Speech Stop Time: 1145     Speech Total (min): 14 min       TREATMENT BILLABLE MINUTES:  Eval Swallow and Oral Function 14 and Total Time 14    Diagnosis: Acute respiratory failure with hypoxia and hypercarbia      Past Medical History:   Diagnosis Date    Arthritis     Cancer     Coronary artery disease     Dementia     Fractures     lt humerus    Hypertension     Schizophrenia     Thyroid disease      Past Surgical History:   Procedure Laterality Date    APPENDECTOMY      colon mass removal    tonsillectomy         Has the patient been evaluated by SLP for swallowing? : Yes  Keep patient NPO?: Yes   General Precautions: Standard, aspiration    Current Respiratory Status: nasal cannula (high flow)     Social Hx: Patient resides at [facility]   Prior diet: Mechanical soft.    CXR: "Features most compatible with congestive failure.  Small bilateral pleural effusions mildly increased on the right"    Subjective:  Pt seen in ICU. Awake and cooperative.Open mouth posture. Congested breath sounds and congested, weak cough prior to PO trials. Lethargic and oriented to self and place only. She is able to follow some simple commands.  "Hi baby, Im hungry."  "I'm going to [place]."    Objective: Pt seen for clinical swallowing evaluation.   Patient found with: chavez catheter, oxygen, telemetry, pulse ox (continuous), peripheral IV, blood pressure cuff    Oral Musculature Evaluation  Oral Musculature: general weakness, unable to assess due to poor participation/comprehension  Dentition: edentulous  Mucosal Quality: good  Oral Labial Strength and Mobility: WFL  Volitional Cough: congested, weak  Volitional " Swallow: larygneal elevation appears adequate for airway protection upon palpation     Bedside Swallow Eval:  Consistencies Assessed: Thin liquids ice chips x2, 1/2 tsp water x1  Oral Phase: mouth breathing  Pharyngeal Phase: coughing/choking    Assessment:  Pham Weinberg is a 77 y.o. female with a medical diagnosis of Acute respiratory failure with hypoxia and hypercarbia and presents with s/s pharyngeal dysphagia and high risk for aspiration 2/2 current respiratory status. Pt given ice chips x2 and 1/2 tsp water x1.  Swallow appeared timely and with good larygneal rise upon palpation, however, cough noted across all 3 trials. No further PO trials assessed. Increased coughing noted upon completion of evaluation. Pt not appropriate for PO at this time. REC NPO, including meds. Will continue to follow for ongoing swallowing assessment    Goals:    SLP Goals        Problem: SLP Goal    Goal Priority Disciplines Outcome   SLP Goal     SLP    Description:    1) Pt will participate in ongoing swallow assessment                     Plan:   Patient to be seen Therapy Frequency: 6 x/week (until completion of ongoing swallow assessment)   Plan of Care expires: 08/06/17  Plan of Care reviewed with: patient  SLP Follow-up?: Yes  SLP - Next Visit Date: 07/28/17      SLP G-Codes  Functional Assessment Tool Used: NOMS  Functional Limitations: Swallowing  Swallow Current Status (): CN  Swallow Goal Status (): CL    Katey Garcia CCC-SLP  07/27/2017

## 2017-07-27 NOTE — NURSING
Patient off BiPAP at approx 0800, patient more alert but still confused to time.  Placed on 3l nasal cannula at 0800, patient was doing well and maintaining O2 sats, until patient started having frequent, non productive croupy coughing, O2 sats dropping to 70's and 80's. 0910 NT suctioned with minimal results.  O2 sats still low. Placed on high flow nasal cannula at 6 l, will monitor closely for de sating.     Patient does not tolerate face mask, pulls off frequently, she tolerates nasal cannula, if patient needs face mask or BiPAP, will have to restrain.

## 2017-07-27 NOTE — PROGRESS NOTES
Progress Note  Hospital Medicine  Patient Name:Pham Weinberg  MRN:  [id number]  Patient Class: IP- Inpatient  Admit Date: 7/26/2017  Length of Stay: 1 days  Expected Discharge Date:   Attending Physician: Ara Wilkinson MD  Primary Care Provider:  Provider [facility]    SUBJECTIVE:     Principal Problem: Acute respiratory failure with hypoxia and hypercarbia  Initial history of present illness: Patient is a 77 y.o. female admitted to Hospitalist Service from [facility] Emergency Room in ICU with complaint of altered mental status. Patient reportedly has past medical history significant for dementia, Schizophrenia, hypertension, hypothyroidism, CAD and unspecified cancer history. Per nursing home papers, patient is DNR. Patient is a resident of [facility]. Patient presented with EMS with complaint of altered mental status and SOB. Patient is not able to communicate. No family present at this time. Reportedly, patient's oxygen sats were in 50s. Reportedly, patient had been confused at nursing home for several days and she was accepted a a local psych facility for management but noted to have low sats today. Presently, patient is minimally responsive and have been placed on BiPAP and is going to ICU for further management. Patient was last month hospitalized for pneumonia.    PMH/PSH/SH/FH/Meds: reviewed.    Symptoms/Review of Systems: Patient is more awake and responsive, easily falls asleep during interview, used BiPAP last night. No shortness of breath, cough, chest pain or headache, fever or abdominal pain.     Diet:  Dental soft  Activity level: Up with assistance  Pain:  Patient reports no pain.       OBJECTIVE:   Vital Signs (Most Recent):      Temp: 98.9 °F (37.2 °C) (07/27/17 0400)  Pulse: 101 (07/27/17 0742)  Resp: (!) 25 (07/27/17 0742)  BP: (!) 129/59 (07/27/17 0400)  SpO2: 99 % (07/27/17 0742)       Vital Signs Range (Last 24H):  Temp:  [98.1 °F (36.7 °C)-98.9 °F (37.2 °C)]   Pulse:  []    Resp:  [16-33]   BP: (127-159)/(59-71)   SpO2:  [87 %-100 %]     Weight: 74.9 kg (165 lb 2 oz)  Body mass index is 28.34 kg/m².    Intake/Output Summary (Last 24 hours) at 07/27/17 0829  Last data filed at 07/27/17 0819   Gross per 24 hour   Intake             1106 ml   Output             2200 ml   Net            -1094 ml     Physical Examination:  General appearance: well developed, appears stated age  Head: normocephalic, atraumatic  Eyes:  conjunctivae/corneas clear. PERRL.  Nose: Nares normal. Septum midline.  Throat: lips, mucosa, and tongue normal; teeth and gums normal, no throat erythema.  Neck: supple, symmetrical, trachea midline, + JVD and thyroid not enlarged, symmetric, no tenderness/mass/nodules  Lungs: Fine bi-basal rales  Chest wall: no tenderness  Heart: regular rate and rhythm, S1, S2 normal, no murmur, click, rub or gallop  Abdomen: soft, non-tender non-distented; bowel sounds normal; no masses,  no organomegaly  Extremities: no cyanosis, clubbing, 1+ edema.   Pulses: 2+ and symmetric  Skin: Skin color, texture, turgor normal. No rashes or lesions.  Lymph nodes: Cervical, supraclavicular, and axillary nodes normal.  Neurologic: Grossly non-focal, lethargic.    CBC:    Recent Labs  Lab 07/26/17  1432 07/27/17  0646   WBC 9.40 6.70   RBC 2.13* 2.67*   HGB 6.3* 7.9*   HCT 19.8* 23.4*   * 159   MCV 93 88   MCH 29.6 29.8   MCHC 31.8* 34.0   BMP    Recent Labs  Lab 07/26/17  1432 07/27/17  0646   * 106    141   K 4.0 3.3*    99   CO2 24 31*   BUN 23 18   CREATININE 1.1 0.9   CALCIUM 8.6* 8.1*      Diagnostic Results:  Microbiology Results (last 7 days)     ** No results found for the last 168 hours. **         Chest X-Ray: Moderate cardiomegaly increased from prior study.  Prominence of the pulmonary vasculature and faint infiltrates at the lung bases may represent pulmonary edema.     CT head: Negative Head CT.     ECHO (06-23-17):    1 - Eccentric hypertrophy.     2 - No  wall motion abnormalities.     3 - Hyperdynamic left ventricular systolic function (EF 65-70%).     4 - Normal left ventricular diastolic function.     5 - Right ventricular enlargement with hyperdynamic systolic function.     6 - The estimated PA systolic pressure is greater than 26 mmHg.     7 - Difficult windows.     Assessment/Plan:      *Acute respiratory failure with hypoxia and hypercarbia [J96.01, J96.02]  Use supplemental oxygen to maintain PO2 above 92%.  Use BiPAP as needed.   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  Neurochecks.  Hopefully with correction of hypoxia and hypercarbia, mental will improve.  avoid psychotropic medications.     Yes    Schizophrenia [F20.9]  Closely monitor mental status.  Psychitric medications are on for now.      Yes    Hypothyroidism [E03.9]  Chronic problem. Will continue chronic medications and monitor for any changes, adjusting as needed.     Yes    Acute diastolic heart failure [I50.31]  Supplemental O2 via nasal canula; titrate O2 saturation to >92%.  Lasix 20 mg IV q 12 hrs. Monitor electrolytes for hypokalemia and hypomagnesemia. Await cardiology input.  Daily weights.  Strict I/Os.  Fluid restriction.  Na restriction <2g/d.     Yes    Type 2 diabetes mellitus with ophthalmic complication, without long-term current use of insulin [E11.39]  Check blood glucose level q 6.  Use Novolog Insulin Sliding Scale as needed.   Continue American Diabetic Association 1800 Kcal diet.     Yes    Dementia without behavioral disturbance [F03.90]  Dementia is controlled currently. Continue home dementia meds and non-pharmacologic interventions to prevent delirium (No VS between 11PM-5AM, activity during day, opening blinds, providing glasses/hearing aids, and up in chair during daytime). Use PRN anti-psychotics to prevent behavior of self harm during sundowning, and avoid narcotics and benzos unless absolutely  necessary. PRN anti-psychotics prescribed to avoid self harm behaviors.     Yes    Symptomatic anemia - Iron deficiency type[D64.9] s/p 2 PRBC  Would need PO iron supplementation upon DC.  Transfuse another 2 units follows by IV lasix.  Follow CBC.      Yes       Code status : DNR.    VTE Risk Mitigation         Ordered     enoxaparin injection 40 mg  Daily     Route:  Subcutaneous        07/26/17 1647     Medium Risk of VTE  Once      07/26/17 1647        Ara Wilkinson MD  Department of Hospital Medicine   [facility]

## 2017-07-27 NOTE — PLAN OF CARE
Pt was sleeping with no family in the room at 9:45 a.m.   Phone contact with pt's sister Effie, [phone] to complete the assessment.  Pt is is single with no children and her sister Effie is her POA.  Pt admitted to [facility] from [facility] where pt has been since April 26, 2017.   Pt walks minimally with a rolling walker however mainly is wheelchair bound.  Pt's discharge plan is to return to [facility] private pay.       07/27/17 1027   Discharge Assessment   Assessment Type Discharge Planning Assessment   Confirmed/corrected address and phone number on facesheet? Yes  (address/phone number on demographic sheet are sister Effie)   Assessment information obtained from? Other  (pt's POA, sister Effie)   Communicated expected length of stay with patient/caregiver no   Type of Healthcare Directive Received Durable power of  for health care  (POA is sister Effie Cuellar, [phone])   If Healthcare Directive is received, is it scanned into Epic? yes   Prior to hospitilization cognitive status: Unable to Assess   Prior to hospitalization functional status: Needs Assistance;Assistive Equipment;Wheelchair Bound  (WC is primary mode of locomotion.  Pt walks minimally with a rollling walker.)   Current cognitive status: Unable to Assess   Current Functional Status: Completely Dependent   Arrived From nursing home  ([facility])   Lives With facility resident   Able to Return to Prior Arrangements yes   Is patient able to care for self after discharge? No   How many people do you have in your home that can help with your care after discharge? other (see comments)  (resident at [facility])   Patient's perception of discharge disposition [facility] care facility  (per family)   Readmission Within The Last 30 Days no previous admission in last 30 days   Patient currently being followed by outpatient case management? No   Patient currently receives home health  services? No   Does the patient currently use HME? Yes   Patient currently receives private duty nursing? No   Equipment Currently Used at Home wheelchair;walker, rolling   Do you have any problems affording any of your prescribed medications? No   Is the patient taking medications as prescribed? yes   Do you have any financial concerns preventing you from receiving the healthcare you need? No   Does the patient have transportation to healthcare appointments? Yes   Transportation Available van, wheelchair accessible   On Dialysis? No   Does the patient receive services at the Coumadin Clinic? No   Discharge Plan A Return to nursing home  (private pay [facility] care at [facility])   Patient/Family In Agreement With Plan yes

## 2017-07-27 NOTE — PLAN OF CARE
07/26/17 2203   Patient Assessment/Suction   Level of Consciousness (AVPU) alert   Respiratory Effort Normal;Unlabored   Expansion/Accessory Muscles/Retractions no retractions;no use of accessory muscles   All Lung Fields Breath Sounds coarse;wheezes, expiratory;wheezes, inspiratory   Cough Type nonproductive   PRE-TX-O2-ETCO2   O2 Device (Oxygen Therapy) BiPAP   Oxygen Concentration (%) 35   SpO2 99 %   Pulse Oximetry Type Continuous   Pulse 97   Resp (!) 21   Aerosol Therapy   $ Aerosol Therapy Charges Aerosol Treatment   Respiratory Treatment Status given   SVN/Inhaler Treatment Route in-line;with oxygen   Position During Treatment HOB at 45 degrees   Patient Tolerance good   Post-Treatment   Post-treatment Heart Rate (beats/min) 97   Post-treatment Resp Rate (breaths/min) 18   All Fields Breath Sounds unchanged   Ready to Wean/Extubation Screen   FIO2<=50 (chart decimal) 0.35   Preset CPAP/BiPAP Settings   Mode Of Delivery BiPAP   $ Initial CPAP/BiPAP Setup? No   $ Is patient using? Yes   Equipment Type V60   Airway Device Type total face mask   Humidifier not applicable   Ipap 12   EPAP (cm H2O) 6   Pressure Support (cm H2O) 6   Set Rate (Breaths/Min) 10   ITime (sec) 1   Rise Time (sec) 0.2   Patient CPAP/BiPAP Settings   Timed Inspiration (Sec) 1   IPAP Rise Time (sec) 0.2   RR Total (Breaths/Min) 17   Tidal Volume (mL) 585   Peak Inspiratory Pressure (cm H2O) 12   TiTOT (%) 23   Total Leak (L/Min) 21   Patient Trigger - ST Mode Only (%) 93   CPAP/BiPAP Alarms   High Pressure (cm H2O) 17   Low Pressure (cm H2O) 7   Low Pressure Delay (Sec) 20   Minute Ventilation (L/Min) 3   High RR (breaths/min) 40   Low RR (breaths/min) 8   Apnea (Sec) 20

## 2017-07-27 NOTE — PLAN OF CARE
Problem: Patient Care Overview  Goal: Individualization & Mutuality  Two units PRBC's and continuous bipap through the shift.  Pt more responsive with stable vitals.

## 2017-07-27 NOTE — PLAN OF CARE
Problem: Patient Care Overview  Goal: Plan of Care Review  Outcome: Ongoing (interventions implemented as appropriate)  Pt failed swallow eval today, strict NPO recommended. Pt frequently crying out food food and drink. Pt received 2 units prbc today. After 1st unit of prbc pt did desat, she was give iv lasix and placed on bipap. Pt continuously pulling bipap off and when she does she desats. She was placed in derek soft wrist restraints d/t confusion and removing medically necessary equipment.

## 2017-07-28 PROBLEM — I50.9 ACUTE ON CHRONIC CONGESTIVE HEART FAILURE: Status: ACTIVE | Noted: 2017-01-01

## 2017-07-28 NOTE — PROGRESS NOTES
07/27/17 1913   PRE-TX-O2-ETCO2   O2 Device (Oxygen Therapy) BiPAP   $ Is the patient on Oxygen? Yes   $ Is the patient on high flow oxygen? Yes   Oxygen Concentration (%) 75   SpO2 97 %   Pulse Oximetry Type Continuous   $ Pulse Oximetry - Multiple Charge Pulse Oximetry - Multiple   Pulse 94   Resp (!) 28   Ready to Wean/Extubation Screen   FIO2<=50 (chart decimal) (!) 0.75   Preset CPAP/BiPAP Settings   Mode Of Delivery BiPAP   $ CPAP/BiPAP Daily Charge BiPAP/CPAP Daily   $ Initial CPAP/BiPAP Setup? No   $ Is patient using? Yes   Equipment Type V60   Airway Device Type medium full face mask   Humidifier not applicable   Ipap 12   EPAP (cm H2O) 6   Pressure Support (cm H2O) 6   Set Rate (Breaths/Min) 10   ITime (sec) 1   Rise Time (sec) 0.2   Patient CPAP/BiPAP Settings   Timed Inspiration (Sec) 1   IPAP Rise Time (sec) 0.2   RR Total (Breaths/Min) 23   Tidal Volume (mL) 385   Peak Inspiratory Pressure (cm H2O) 12   TiTOT (%) 32   Total Leak (L/Min) 3   Patient Trigger - ST Mode Only (%) 98

## 2017-07-28 NOTE — PT/OT/SLP PROGRESS
Speech Language Pathology      Pham Weinberg  MRN: [id number]    Patient not seen today secondary to  (On BIPAP. Not approrpriate per RN). Will follow-up 7/29/17.    Katey Garcia, PAULO-SLP

## 2017-07-28 NOTE — PLAN OF CARE
Problem: Patient Care Overview  Goal: Plan of Care Review  Outcome: Ongoing (interventions implemented as appropriate)  Patient remains on Bipap with Ipap 16, Epap 6, rate 10 and fi02 .  Hr 96 and 02 saturation 96%.  Patient receiving aerosol tx now and q6hr prn.  Patient requiring nt suction prn.

## 2017-07-28 NOTE — PROGRESS NOTES
Progress Note  Cardiology    Admit Date: 7/26/2017   LOS: 2 days     Follow-up For:  Sob; ams; chf; anaemia    Scheduled Meds:   aspirin  81 mg Oral Daily    bimatoprost  1 drop Both Eyes QHS    enoxaparin  40 mg Subcutaneous Daily    furosemide  40 mg Intravenous BID    lactulose  20 g Oral BID    levothyroxine  100 mcg Intravenous Daily    metoprolol tartrate  25 mg Oral BID    potassium chloride  10 mEq Intravenous Once    sodium chloride 0.9%  3 mL Intravenous Q8H     Continuous Infusions:   PRN Meds:sodium chloride, sodium chloride, albuterol-ipratropium 2.5mg-0.5mg/3mL, dextrose 50%, furosemide, furosemide, glucagon (human recombinant), insulin aspart    Review of patient's allergies indicates:   Allergen Reactions    Bacitracin     Tuberculin     Tuberculin julianne test ppd        SUBJECTIVE:     Interval History: Patient on bipap and less responsive today..    Review of Systems  Respiratory: positive for cough, negative for sputum and wheezing  Cardiovascular: positive for dyspnea, on bipap.    OBJECTIVE:     Vital Signs (Most Recent)  Temp: 98.3 °F (36.8 °C) (07/28/17 1200)  Pulse: 102 (07/28/17 1500)  Resp: (!) 28 (07/28/17 1500)  BP: 139/61 (07/28/17 1500)  SpO2: 96 % (07/28/17 1500)    Vital Signs Range (Last 24H):  Temp:  [97.9 °F (36.6 °C)-99.4 °F (37.4 °C)]   Pulse:  []   Resp:  [20-49]   BP: (139-169)/(56-76)   SpO2:  [88 %-100 %]       Physical Exam:  Neck: no carotid bruit, no JVD and supple, symmetrical, trachea midline  Lungs: diminished breath sounds right chest. No tubular bs.  Heart: regular rate and rhythm, S1, S2 normal, no murmur, click, rub or gallop  Abdomen: soft, non-tender; bowel sounds normal; no masses,  no organomegaly  Extremities: Extremities normal, atraumatic, no cyanosis, clubbing, or edema    Recent Results (from the past 24 hour(s))   POCT glucose    Collection Time: 07/27/17  5:19 PM   Result Value Ref Range    POCT Glucose 146 (H) 70 - 110 mg/dL    Procalcitonin    Collection Time: 07/27/17  7:30 PM   Result Value Ref Range    Procalcitonin 0.12 <0.25 ng/mL   Basic metabolic panel    Collection Time: 07/27/17  7:31 PM   Result Value Ref Range    Sodium 140 136 - 145 mmol/L    Potassium 3.6 3.5 - 5.1 mmol/L    Chloride 98 95 - 110 mmol/L    CO2 31 (H) 23 - 29 mmol/L    Glucose 123 (H) 70 - 110 mg/dL    BUN, Bld 17 8 - 23 mg/dL    Creatinine 0.9 0.5 - 1.4 mg/dL    Calcium 8.5 (L) 8.7 - 10.5 mg/dL    Anion Gap 11 8 - 16 mmol/L    eGFR if African American >60 >60 mL/min/1.73 m^2    eGFR if non African American >60 >60 mL/min/1.73 m^2   CBC auto differential    Collection Time: 07/28/17  4:43 AM   Result Value Ref Range    WBC 13.90 (H) 3.90 - 12.70 K/uL    RBC 3.65 (L) 4.00 - 5.40 M/uL    Hemoglobin 10.7 (L) 12.0 - 16.0 g/dL    Hematocrit 32.0 (L) 37.0 - 48.5 %    MCV 88 82 - 98 fL    MCH 29.5 27.0 - 31.0 pg    MCHC 33.5 32.0 - 36.0 g/dL    RDW 19.0 (H) 11.5 - 14.5 %    Platelets 169 150 - 350 K/uL    MPV 9.0 (L) 9.2 - 12.9 fL    Gran # 11.7 (H) 1.8 - 7.7 K/uL    Lymph # 0.9 (L) 1.0 - 4.8 K/uL    Mono # 1.0 0.3 - 1.0 K/uL    Eos # 0.0 0.0 - 0.5 K/uL    Baso # 0.30 (H) 0.00 - 0.20 K/uL    Gran% 84.3 (H) 38.0 - 73.0 %    Lymph% 6.8 (L) 18.0 - 48.0 %    Mono% 6.9 4.0 - 15.0 %    Eosinophil% 0.1 0.0 - 8.0 %    Basophil% 1.9 0.0 - 1.9 %    Differential Method Automated    Basic metabolic panel    Collection Time: 07/28/17  4:43 AM   Result Value Ref Range    Sodium 143 136 - 145 mmol/L    Potassium 3.4 (L) 3.5 - 5.1 mmol/L    Chloride 98 95 - 110 mmol/L    CO2 36 (H) 23 - 29 mmol/L    Glucose 121 (H) 70 - 110 mg/dL    BUN, Bld 17 8 - 23 mg/dL    Creatinine 0.9 0.5 - 1.4 mg/dL    Calcium 8.6 (L) 8.7 - 10.5 mg/dL    Anion Gap 9 8 - 16 mmol/L    eGFR if African American >60 >60 mL/min/1.73 m^2    eGFR if non African American >60 >60 mL/min/1.73 m^2   Brain natriuretic peptide    Collection Time: 07/28/17  4:43 AM   Result Value Ref Range    BNP 1,782 (H) 0 - 99 pg/mL    POCT glucose    Collection Time: 07/28/17  5:22 AM   Result Value Ref Range    POCT Glucose 118 (H) 70 - 110 mg/dL   POCT glucose    Collection Time: 07/28/17  2:30 PM   Result Value Ref Range    POCT Glucose 118 (H) 70 - 110 mg/dL       Diagnostic Results:  Labs: Reviewed  ECG: Reviewed  X-Ray: Reviewed    ASSESSMENT/PLAN:     BNP elevated right lung near white out. Suspect atelectasis.    Plan: Lasix iv bid. ?? Bronch. NPO for aspirtion risk.

## 2017-07-28 NOTE — PROGRESS NOTES
Progress Note  Pulmonary/Critical Care Medicine    Admit Date: 7/26/2017    SUBJECTIVE:     Follow-up For:  Acute respiratory failure    Review of Systems:  Unable to assess between the Bipap and dementia    OBJECTIVE:     Vital Signs (Most Recent)  Temp: 98.8 °F (37.1 °C) (07/28/17 0745)  Pulse: 103 (07/28/17 0700)  Resp: (!) 30 (07/28/17 0700)  BP: (!) 169/72 (07/28/17 0700)  SpO2: 96 % (07/28/17 0700)    I & O (Last 24H):  Intake/Output Summary (Last 24 hours) at 07/28/17 0759  Last data filed at 07/28/17 0500   Gross per 24 hour   Intake              162 ml   Output             2500 ml   Net            -2338 ml       Physical Exam:  General: well developed, well nourished  HENT: Head:normocephalic, atraumatic. Ears:present.   Nose and pharynx covered with Bipap..  Lungs: rhonchi bilaterally with rub on the L  Cardiovascular: Heart: regular rate and rhythm, S1, S2 normal, no murmur, click, rub or gallop. Chest Wall: no tenderness.   Extremities: no cyanosis or edema, or clubbing.   Pulses: 2+ and symmetric.   Abdomen: soft, non-tender non-distented; bowel sounds normal; no masses,  no organomegaly.   Skin: Skin color, texture, turgor normal. No rashes or lesions  Musculoskeletal: normal muscle tone and joint movement.  Lymph Nodes: No cervical or supraclavicular adenopathy  Neurologic: Normal strength and tone. No focal numbness or weakness  Psych/Behavioral:  Sedated affect.    Laboratory:  CBC:    Recent Labs  Lab 07/28/17  0443   WBC 13.90*   RBC 3.65*   HGB 10.7*   HCT 32.0*      MCV 88   MCH 29.5   MCHC 33.5     CMP    Recent Labs  Lab 07/28/17  0443   CALCIUM 8.6*      K 3.4*   CO2 36*   CL 98   BUN 17   CREATININE 0.9       Oxygen Concentration (%):  [75] 75      Diagnostic Results:  Labs: Reviewed  X-Ray: Reviewed  No chest xray today, but yesterdays showed bilateral effusions and basilar infiltrates  ASSESSMENT/PLAN:   Acute respiratory failure secondary to CHF with effusions and volume  overload  Dementia, schizophrenia  CHF  Anemia    Lasix 40mg  CXR today and tomorrow  Adjust Bipap to allow weaning FiO2 to 50%  DNR, DNI status noted

## 2017-07-28 NOTE — PLAN OF CARE
Problem: Patient Care Overview  Goal: Plan of Care Review  Outcome: Ongoing (interventions implemented as appropriate)  Patient has been stable overnight.  Requiring high concentrations of oxygen via BiPap.  Desats quickly if Bipap becomes disconnected.  Received 2 units of packed cells yesterday on day shift with a dose of lasix at the beginning of this shift following the last unit of packed cells.  Diuresed ok but lungs less congested.  BNP this am, awaiting results.  Bath and linen change done.  No skin breakdown noted but there is evidence of recent dermatitis perhaps from urine.  Patient currently has a chavez.  Excellent renata care done during bath and barrier cream applied liberally to this area.

## 2017-07-28 NOTE — PLAN OF CARE
I called pt's sister, Effie (who has MPOA, we have a copy in Epic) for Dr Wilkinson to speak with regarding pt's condition, prognosis and plan of care.   Dr Wilkinson spoke with Effie answered all questions at this time. Effie states she will be at the hospital this afternoon to make further decisions....OLIVIA Harper CM

## 2017-07-29 PROBLEM — J95.84 TRALI (TRANSFUSION RELATED ACUTE LUNG INJURY): Status: RESOLVED | Noted: 2017-01-01 | Resolved: 2017-01-01

## 2017-07-29 NOTE — PLAN OF CARE
Problem: Patient Care Overview  Goal: Plan of Care Review  Outcome: Ongoing (interventions implemented as appropriate)  Patient requires continuous BiPAP to maintain oxygen saturation. Was able to wean oxygen down for several hours, then had to increase it again. Patient remains NPO. VSS. Patient denies pain. Patient slept for most of the night after receiving geodon.

## 2017-07-29 NOTE — PROGRESS NOTES
This note also relates to the following rows which could not be included:  Pulse - Cannot attach notes to unvalidated device data  Resp - Cannot attach notes to unvalidated device data  BP - Cannot attach notes to unvalidated device data       07/28/17 2000   Patient Assessment/Suction   Level of Consciousness (AVPU) alert   Respiratory Effort Mild   Expansion/Accessory Muscles/Retractions no use of accessory muscles;no retractions   All Lung Fields Breath Sounds crackles;diminished   Rhythm/Pattern, Respiratory mechanical device   Cough Frequency infrequent   Cough Type nonproductive   Suction Method not required   PRE-TX-O2-ETCO2   O2 Device (Oxygen Therapy) BiPAP   $ Is the patient on Oxygen? Yes   Oxygen Concentration (%) 50   SpO2 100 %   Ready to Wean/Extubation Screen   FIO2<=50 (chart decimal) 0.5   Preset CPAP/BiPAP Settings   Mode Of Delivery BiPAP   $ Initial CPAP/BiPAP Setup? No   $ Is patient using? Yes   Equipment Type V60   Airway Device Type large full face mask   Humidifier not applicable   Ipap 15   EPAP (cm H2O) 6   Pressure Support (cm H2O) 9   Set Rate (Breaths/Min) 10   ITime (sec) 1   Rise Time (sec) 0.1   Patient CPAP/BiPAP Settings   Timed Inspiration (Sec) 1   IPAP Rise Time (sec) 1   RR Total (Breaths/Min) 26   Tidal Volume (mL) 410   Peak Inspiratory Pressure (cm H2O) 16   TiTOT (%) 31   Total Leak (L/Min) 1   Patient Trigger - ST Mode Only (%) 100   CPAP/BiPAP Alarms   High Pressure (cm H2O) 20   Low Pressure (cm H2O) 10   Low Pressure Delay (Sec) 10   Minute Ventilation (L/Min) 3   High RR (breaths/min) 40   Low RR (breaths/min) 8   Apnea (Sec) 20

## 2017-07-29 NOTE — ASSESSMENT & PLAN NOTE
- Zosyn  - Duonebs   - patient wanted to eat but will need to have long discussion about goals of care

## 2017-07-29 NOTE — SUBJECTIVE & OBJECTIVE
Interval History: Patient seen and examined. Requiring geodon overnight 2/2 agitation.  Remains BIPAP dependant.  Afebrile but elevated WBC today.     Review of Systems   Unable to perform ROS: Acuity of condition   Gastrointestinal: Negative for abdominal distention, abdominal pain, blood in stool, diarrhea and vomiting.   Genitourinary: Negative for dysuria and hematuria.   Neurological: Negative for headaches.   Hematological: Negative for adenopathy.   Psychiatric/Behavioral: Negative for confusion.     Objective:     Vital Signs (Most Recent):  Temp: 97.8 °F (36.6 °C) (07/29/17 0315)  Pulse: 86 (07/29/17 0748)  Resp: 15 (07/29/17 0748)  BP: (!) 131/58 (07/29/17 0600)  SpO2: 96 % (07/29/17 0748) Vital Signs (24h Range):  Temp:  [97.7 °F (36.5 °C)-101.3 °F (38.5 °C)] 97.8 °F (36.6 °C)  Pulse:  [] 86  Resp:  [15-38] 15  SpO2:  [88 %-100 %] 96 %  BP: (117-178)/(57-79) 131/58     Weight: 74.9 kg (165 lb 2 oz)  Body mass index is 28.34 kg/m².    Intake/Output Summary (Last 24 hours) at 07/29/17 0847  Last data filed at 07/29/17 0515   Gross per 24 hour   Intake              500 ml   Output             1135 ml   Net             -635 ml      Physical Exam   Constitutional: No distress.   On bipap; ill appearing   HENT:   Head: Normocephalic and atraumatic.   Eyes: Pupils are equal, round, and reactive to light.   Neck: Neck supple. No thyromegaly present.   Cardiovascular: Normal rate and regular rhythm.  Exam reveals no gallop and no friction rub.    No murmur heard.  Pulmonary/Chest: No respiratory distress.   Crackles bilaterally   Abdominal: Soft. Bowel sounds are normal. She exhibits no distension. There is no tenderness. There is no guarding.   Musculoskeletal: Normal range of motion. She exhibits edema.   Skin: Skin is warm and dry. No erythema.   Psychiatric: She has a normal mood and affect.       Significant Labs:   CBC:   Recent Labs  Lab 07/28/17  0443 07/29/17  0442   WBC 13.90* 15.80*   HGB 10.7*  11.0*   HCT 32.0* 33.8*    162       Significant Imaging: I have reviewed all pertinent imaging results/findings within the past 24 hours.

## 2017-07-29 NOTE — PLAN OF CARE
Problem: Patient Care Overview  Goal: Plan of Care Review  Outcome: Ongoing (interventions implemented as appropriate)  Bipap in use with documented setting, alarms on and functioning, aerosol txs PRN tolerates well.

## 2017-07-29 NOTE — PT/OT/SLP EVAL
Speech Language Pathology      Pham Weinberg  MRN: [id number]    Patient not seen today secondary to  (On BIPAP. Not approrpriate per RN). Will follow-up Monday.    Kim Garcia CCC-SLP/A

## 2017-07-29 NOTE — PROGRESS NOTES
[facility] Hospital Medicine  Progress Note    Patient Name: Pham Weinberg  MRN: [id number]  Patient Class: IP- Inpatient   Admission Date: 7/26/2017  Length of Stay: 3 days  Attending Physician: Ara Wilkinson MD  Primary Care Provider: Freddie Johnson MD        Subjective:     Principal Problem:Acute respiratory failure with hypoxia and hypercarbia    HPI:  No notes on file    Hospital Course:  No notes on file    Interval History: Patient seen and examined. Requiring geodon overnight 2/2 agitation.  Remains BIPAP dependant.  Afebrile but elevated WBC today.     Review of Systems   Unable to perform ROS: Acuity of condition   Gastrointestinal: Negative for abdominal distention, abdominal pain, blood in stool, diarrhea and vomiting.   Genitourinary: Negative for dysuria and hematuria.   Neurological: Negative for headaches.   Hematological: Negative for adenopathy.   Psychiatric/Behavioral: Negative for confusion.     Objective:     Vital Signs (Most Recent):  Temp: 97.8 °F (36.6 °C) (07/29/17 0315)  Pulse: 86 (07/29/17 0748)  Resp: 15 (07/29/17 0748)  BP: (!) 131/58 (07/29/17 0600)  SpO2: 96 % (07/29/17 0748) Vital Signs (24h Range):  Temp:  [97.7 °F (36.5 °C)-101.3 °F (38.5 °C)] 97.8 °F (36.6 °C)  Pulse:  [] 86  Resp:  [15-38] 15  SpO2:  [88 %-100 %] 96 %  BP: (117-178)/(57-79) 131/58     Weight: 74.9 kg (165 lb 2 oz)  Body mass index is 28.34 kg/m².    Intake/Output Summary (Last 24 hours) at 07/29/17 0847  Last data filed at 07/29/17 0515   Gross per 24 hour   Intake              500 ml   Output             1135 ml   Net             -635 ml      Physical Exam   Constitutional: No distress.   On bipap; ill appearing   HENT:   Head: Normocephalic and atraumatic.   Eyes: Pupils are equal, round, and reactive to light.   Neck: Neck supple. No thyromegaly present.   Cardiovascular: Normal rate and regular rhythm.  Exam reveals no gallop and no friction rub.    No murmur  heard.  Pulmonary/Chest: No respiratory distress.   Crackles bilaterally   Abdominal: Soft. Bowel sounds are normal. She exhibits no distension. There is no tenderness. There is no guarding.   Musculoskeletal: Normal range of motion. She exhibits edema.   Skin: Skin is warm and dry. No erythema.   Psychiatric: She has a normal mood and affect.       Significant Labs:   CBC:   Recent Labs  Lab 07/28/17  0443 07/29/17  0442   WBC 13.90* 15.80*   HGB 10.7* 11.0*   HCT 32.0* 33.8*    162       Significant Imaging: I have reviewed all pertinent imaging results/findings within the past 24 hours.    Assessment/Plan:      Acute on chronic congestive heart failure    - diuresis  - BIPAP  - BB          Aspiration pneumonia    - Zosyn  - Duonebs   - patient wanted to eat but will need to have long discussion about goals of care          Acute pulmonary edema    - diuresis  - BIPAP            Acute confusional state - likely metbolic encephalopathy    - multifactorial  - PRN geodon          Hypoxia    - multifactorial  - likely secondary to aspiration and pulmonary edema  - continue Zosyn  - added Azith for atypical coverage  - continue diuresis  - BIPAP as needed  - DNI  - Will discuss goals of care with family today          Acute diastolic heart failure    - continue diuresis as tolerated  - BB  - ACEI added when appropriate          Type 2 diabetes mellitus with ophthalmic complication, without long-term current use of insulin    - SSI  - goal glucose <180          Dementia without behavioral disturbance    - PRN geodon for agitation          Hypothyroidism    - on synthroid  - will continue to monitor          Symptomatic anemia    - stable  - no overt bleeding  - monitor          * Acute respiratory failure with hypoxia and hypercarbia    - as above; multifactorial            VTE Risk Mitigation         Ordered     enoxaparin injection 40 mg  Daily     Route:  Subcutaneous        07/26/17 2723     Medium Risk of  VTE  Once      07/26/17 1647          Nathaniel Riggins MD  Department of Hospital Medicine   [facility]

## 2017-07-29 NOTE — ASSESSMENT & PLAN NOTE
- multifactorial  - likely secondary to aspiration and pulmonary edema  - continue Zosyn  - added Azith for atypical coverage  - continue diuresis  - BIPAP as needed  - DNI

## 2017-07-29 NOTE — PROGRESS NOTES
Progress Note  Pulmonary/Critical Care Medicine    Admit Date: 7/26/2017    SUBJECTIVE:     Follow-up For:  Acute respiratory failure    Review of Systems:  Unable to assess other than she is thirsty, hungry and cold    OBJECTIVE:     Vital Signs (Most Recent)  Temp: 97.8 °F (36.6 °C) (07/29/17 0315)  Pulse: 86 (07/29/17 0748)  Resp: 15 (07/29/17 0748)  BP: (!) 131/58 (07/29/17 0600)  SpO2: 96 % (07/29/17 0748)    I & O (Last 24H):    Intake/Output Summary (Last 24 hours) at 07/29/17 1025  Last data filed at 07/29/17 0515   Gross per 24 hour   Intake              500 ml   Output             1135 ml   Net             -635 ml       Physical Exam:  General: well developed, well nourished  HENT: Head:normocephalic, atraumatic. Ears:present.   Nose intact, Pharynx moist.  Lungs: rhonchi bilaterally, NT suctioning produced large volume of mucus   Cardiovascular: Heart: regular rate and rhythm, S1, S2 normal, no murmur, click, rub or gallop. Chest Wall: no tenderness.   Extremities: no cyanosis or edema, or clubbing.   Pulses: 2+ and symmetric.   Abdomen: soft, non-tender non-distented; bowel sounds normal; no masses,  no organomegaly.   Skin: Skin color, texture, turgor normal. No rashes or lesions  Musculoskeletal: normal muscle tone and joint movement.  Lymph Nodes: No cervical or supraclavicular adenopathy  Neurologic: Normal strength and tone. No focal numbness or weakness  Psych/Behavioral:  Sedated affect.    Laboratory:  CBC:    Recent Labs  Lab 07/29/17  0442   WBC 15.80*   RBC 3.77*   HGB 11.0*   HCT 33.8*      MCV 90   MCH 29.2   MCHC 32.6     CMP    Recent Labs  Lab 07/29/17  0442   CALCIUM 8.8      K 3.6   CO2 34*   CL 98   BUN 22   CREATININE 1.0       Oxygen Concentration (%):  [40-50] 40      Diagnostic Results:  Labs: Reviewed  X-Ray: Reviewed  Impression:       1.  Severe alveolar consolidation in the right mid and lower lung zones for which differential considerations include pneumonia and  aspiration.  2. A background of cardiac enlargement and pulmonary edema is noted suggestive of superimposed CHF. A right pleural effusion is also again noted, slightly smaller when compared to the prior study noting that some of the perceived difference may be technical       ASSESSMENT/PLAN:   Acute respiratory failure secondary to CHF with effusions and volume overload  RLL pneumonia  Dementia, schizophrenia  CHF  Anemia    Allow patient to eat  Try to keep her comfortable on nasal cannula oxygen  NT suction prn

## 2017-07-29 NOTE — NURSING
NT suctioned prn.  Able to suction back of mouth with yanker after mouth care.  Moderate amount of thick creamy secretions.   Placed on HFNC for mouth care but respiration became more labored, so placed back on BIPAP.

## 2017-07-29 NOTE — PLAN OF CARE
Problem: Patient Care Overview  Goal: Plan of Care Review  Outcome: Ongoing (interventions implemented as appropriate)  Respirations labored at times.  Requiring Bipap all the time to maintain O2 sats above 90%.   Remains NPO pending swallow study.   Diuresing with lasix.  Martinez intact.  Safety maintained.

## 2017-07-29 NOTE — PROGRESS NOTES
[facility]  Cardiology  Progress Note    Patient Name: Pham Weinberg  MRN: [id number]  Admission Date: 7/26/2017  Hospital Length of Stay: 3 days  Code Status: DNR   Attending Physician: Ara Wilkinson MD   Primary Care Physician: Freddie Johnson MD  Expected Discharge Date:   Principal Problem:Acute respiratory failure with hypoxia and hypercarbia    Subjective:     Hospital Course: admitted with respiratory failure    Interval History: chf has improve    ROS  Objective:     Vital Signs (Most Recent):  Temp: 98.2 °F (36.8 °C) (07/29/17 1600)  Pulse: 98 (07/29/17 1600)  Resp: (!) 32 (07/29/17 1600)  BP: 128/62 (07/29/17 1600)  SpO2: (!) 91 % (07/29/17 1600) Vital Signs (24h Range):  Temp:  [97.7 °F (36.5 °C)-101.3 °F (38.5 °C)] 98.2 °F (36.8 °C)  Pulse:  [] 98  Resp:  [0-42] 32  SpO2:  [86 %-100 %] 91 %  BP: (115-178)/(56-79) 128/62     Weight: 74.9 kg (165 lb 2 oz)  Body mass index is 28.34 kg/m².    SpO2: (!) 91 %  O2 Device (Oxygen Therapy): High Flow nasal Cannula      Intake/Output Summary (Last 24 hours) at 07/29/17 1657  Last data filed at 07/29/17 1329   Gross per 24 hour   Intake              503 ml   Output              710 ml   Net             -207 ml       Lines/Drains/Airways     Drain                 Urethral Catheter 07/26/17 1650 Non-latex 18 Fr. 3 days          Peripheral Intravenous Line                 Peripheral IV - Single Lumen 07/26/17 1511 Left Antecubital 3 days         Peripheral IV - Single Lumen 07/26/17 1603 Right Antecubital 3 days                Physical Exam    Significant Labs:   CMP   Recent Labs  Lab 07/27/17  1931 07/28/17  0443 07/29/17  0442    143 143   K 3.6 3.4* 3.6   CL 98 98 98   CO2 31* 36* 34*   * 121* 107   BUN 17 17 22   CREATININE 0.9 0.9 1.0   CALCIUM 8.5* 8.6* 8.8   ANIONGAP 11 9 11   ESTGFRAFRICA >60 >60 >60   EGFRNONAA >60 >60 55*   , CBC   Recent Labs  Lab 07/28/17  0443 07/29/17  0442   WBC 13.90* 15.80*   HGB 10.7* 11.0*    HCT 32.0* 33.8*    162   , Lipid Panel No results for input(s): CHOL, HDL, LDLCALC, TRIG, CHOLHDL in the last 48 hours. and Troponin No results for input(s): TROPONINI in the last 48 hours.    Significant Imaging: X-Ray: CXR: X-Ray Chest 1 View (CXR):   Results for orders placed or performed during the hospital encounter of 07/26/17   X-Ray Chest 1 View    Narrative    Comparison: 7/28/17    Technique: Single AP portable chest radiograph.    Findings:The cardiac size is enlarged. There is persistent severe alveolar opacity in the right mid and lower lung zones. A right pleural effusion is noted, slightly smaller when compared to the prior study. No pneumothorax is identified. The visualized left lung is clear. Cardiac monitor leads overlie the chest. Osteoarthritis is seen in the shoulders. Background of mild diffuse interstitial prominence is also noted, suggestive of pulmonary edema. There is increased opacity projecting over the mid lower mediastinum which may reflect a hiatal hernia although difficult to determine on this radiograph.    Impression    1.  Severe alveolar consolidation in the right mid and lower lung zones for which differential considerations include pneumonia and aspiration.  2. A background of cardiac enlargement and pulmonary edema is noted suggestive of superimposed CHF. A right pleural effusion is also again noted, slightly smaller when compared to the prior study noting that some of the perceived difference may be technical.        Electronically signed by: Michael Nguyen MD  Date:     07/29/17  Time:    07:59      Assessment and Plan:     Brief HPI: sob    Active Diagnoses:    Diagnosis Date Noted POA    PRINCIPAL PROBLEM:  Acute respiratory failure with hypoxia and hypercarbia [J96.01, J96.02] 07/26/2017 Yes    Acute on chronic congestive heart failure [I50.9] 07/28/2017 Yes    Acute pulmonary edema [J81.0] 07/27/2017 Yes    Aspiration pneumonia [J69.0] 07/27/2017 Yes     Hypoxia [R09.02] 07/26/2017 Yes    Acute confusional state - likely metbolic encephalopathy [F05] 07/26/2017 Yes    Schizophrenia [F20.9] 07/26/2017 Yes    Hypothyroidism [E03.9] 06/23/2017 Yes    Acute diastolic heart failure [I50.31] 06/23/2017 Yes    Type 2 diabetes mellitus with ophthalmic complication, without long-term current use of insulin [E11.39] 06/23/2017 Yes    Dementia without behavioral disturbance [F03.90] 06/23/2017 Yes    Symptomatic anemia [D64.9] 06/22/2017 Yes      Problems Resolved During this Admission:    Diagnosis Date Noted Date Resolved POA    TRALI (transfusion related acute lung injury) [J95.84] 07/27/2017 07/29/2017 No       VTE Risk Mitigation         Ordered     enoxaparin injection 40 mg  Daily     Route:  Subcutaneous        07/26/17 1647     Medium Risk of VTE  Once      07/26/17 1647          Bryon Watson MD  Cardiology  [facility]

## 2017-07-29 NOTE — NURSING
Restless and agitated.  Attempting to pull off Bipap and throwing legs over side rails.  Soft wrist restraints in use.  NPO status pending swallow study.

## 2017-07-30 PROBLEM — Z51.5 COMFORT MEASURES ONLY STATUS: Status: ACTIVE | Noted: 2017-01-01

## 2017-07-30 NOTE — PROGRESS NOTES
"Patient has pulled bipap off and refuses to wear it at this time.  HFNC placed back to nares at 10 L.  Patient again requesting, begging for and crying for water and food.  Patient states "I'd rather die than live like this."  "I wanna be with eulogio, at least he will take care of me."    "

## 2017-07-30 NOTE — PROGRESS NOTES
Patient is no longer able to maintain her saturations with the HFNC, RR also elevated.  Geodon given and patient placed back on her Bipap mask.  Verbalizes understanding that she needs to keep the mask on.

## 2017-07-30 NOTE — PROGRESS NOTES
Dr. Riggins spoke with patients family regarding patients prognosis, comfort measures or hospice.  Patients family request patient to be comfortable and be able to eat.  Orders for comfort measures placed in chart

## 2017-07-30 NOTE — PROGRESS NOTES
Patient has removed her Bipap mask and is cyanotic with a pulse and BP.  Placed back on bipap mask with quick recovery.  POX initially reads 63% when it began to read after bipap placed back on patient.  Patient is oriented and denies complaints.

## 2017-07-30 NOTE — PROGRESS NOTES
Patient very agitated, pulling Bipap off, requesting chocolate milk and food.  Patient becoming combative. Bilateral soft wrist restraints placed

## 2017-07-30 NOTE — PROGRESS NOTES
Progress Note  Pulmonary/Critical Care Medicine    Admit Date: 7/26/2017    SUBJECTIVE:     Follow-up For:  Acute respiratory failure    Review of Systems:  Unable to assess other than she is thirsty and wants chocolate milk    OBJECTIVE:     Vital Signs (Most Recent)  Temp: 99.2 °F (37.3 °C) (07/30/17 0400)  Pulse: 87 (07/30/17 0800)  Resp: (!) 34 (07/30/17 0754)  BP: (!) 144/62 (07/30/17 0700)  SpO2: 97 % (07/30/17 0800)    I & O (Last 24H):    Intake/Output Summary (Last 24 hours) at 07/30/17 0942  Last data filed at 07/30/17 0650   Gross per 24 hour   Intake              938 ml   Output             1650 ml   Net             -712 ml       Physical Exam:  General: well developed, well nourished  HENT: Head:normocephalic, atraumatic. Ears:present.   Nose intact, Pharynx moist.  Lungs: rhonchi bilaterally,bases are consolidated, NT suctioning produced large volume of mucus   Cardiovascular: Heart: regular rate and rhythm, S1, S2 normal, no murmur, click, rub or gallop. Chest Wall: no tenderness.   Extremities: no cyanosis or edema, or clubbing.   Pulses: 2+ and symmetric.   Abdomen: soft, non-tender non-distented; bowel sounds normal; no masses,  no organomegaly.   Skin: Skin color, texture, turgor normal. No rashes or lesions  Musculoskeletal: normal muscle tone and joint movement.  Lymph Nodes: No cervical or supraclavicular adenopathy  Neurologic: Normal strength and tone. No focal numbness or weakness  Psych/Behavioral:  Sedated affect.    Laboratory:  CBC:    Recent Labs  Lab 07/30/17  0448   WBC 22.30*   RBC 3.79*   HGB 11.0*   HCT 34.1*      MCV 90   MCH 29.2   MCHC 32.4     CMP    Recent Labs  Lab 07/30/17  0448   CALCIUM 8.9      K 3.2*   CO2 37*   CL 95   BUN 24*   CREATININE 1.1       Oxygen Concentration (%):  [50] 50      Diagnostic Results:  Labs: Reviewed  X-Ray: Reviewed   Interval increase in alveolar opacity in the right lung suggestive of pneumonia or aspiration. There is a background  of pulmonary edema which is likely cardiogenic. Small pleural effusions are also noted          ASSESSMENT/PLAN:   Acute respiratory failure secondary to CHF with effusions and volume overload and  RLL pneumonia, worse  Overall condition deteriorating and patient does not want the Bipap, does want chocolate milk and has advanced dementia  Dementia, schizophrenia  CHF  Anemia  Hypokalemia    Called and spoke to the patient's sister about hospice and comfort care.  They will come and will we discuss further.

## 2017-07-30 NOTE — PROGRESS NOTES
07/29/17 2000   Patient Assessment/Suction   Level of Consciousness (AVPU) alert   Respiratory Effort Mild   Expansion/Accessory Muscles/Retractions expansion symmetric   All Lung Fields Breath Sounds coarse   Rhythm/Pattern, Respiratory prolonged expiratory phase;no shortness of breath reported   Cough Frequency infrequent   Cough Type nonproductive   Suction Method not required   PRE-TX-O2-ETCO2   O2 Device (Oxygen Therapy) High Flow nasal Cannula   $ Is the patient on Oxygen? Yes   $ Is the patient on high flow oxygen? Yes   Flow (L/min) 10   SpO2 (!) 91 %   Pulse Oximetry Type Continuous   $ Pulse Oximetry - Multiple Charge Pulse Oximetry - Multiple   Pulse 106   Resp (!) 27   BP (!) 141/63   Positioning HOB elevated 30 degrees

## 2017-07-30 NOTE — PLAN OF CARE
Problem: Patient Care Overview  Goal: Plan of Care Review  Outcome: Ongoing (interventions implemented as appropriate)  Bipap in use with HFNC on standby and resp txs PRN

## 2017-07-30 NOTE — PROGRESS NOTES
Patient is NPO.  Continuously begging for liquids to drink and food.  Patient has aspiration PNA and appears to aspirate whenever she receives anything by mouth.  Speech therapy has been consulted.  Mouth swabbed to ease dryness.  Patient has a history of mental retardation and it is difficult to get her to understand that she would be harming herself by eating and drinking.

## 2017-07-30 NOTE — PLAN OF CARE
After a long discussion with the family, it was decided that with her ongoing illness and prolonged nursing home stay, and the unlikely nature of meaningful recovery, it was decided to make Ms. Weinberg comfort care only.  They understand that without BIPAP she could deteriorate very quickly.  We will stop antibiotics and cardiac medications and initiate PRN ativan and morphine for air hunger, pain, or anxiety.  We will allow pleasure eating as she has been asking for this for some time.  Depending on her course, she may need referral for hospice care.    Dorina with ICU nursing was present for this discussion.

## 2017-07-30 NOTE — PROGRESS NOTES
Patient NT suctioned and returned a moderate amount of secretions.  BiPap mask placed to face to help improve aeration after applying stips of duoderm to cheeks to protect skin.  There is redness on her face from when she wore the bipap earlier.  Patient has received a dose of geodon and is compliant with wearing bipap at this time.

## 2017-07-30 NOTE — PROGRESS NOTES
"Patient refuses to wear bipap again.  States 'Why dont you just leave me alone and let me rest."  "

## 2017-07-31 NOTE — PROGRESS NOTES
07/31/17 1114   PRE-TX-O2-ETCO2   O2 Device (Oxygen Therapy) High Flow nasal Cannula   $ Is the patient on Oxygen? Yes   Flow (L/min) 10   SpO2 (!) 94 %   Pulse Oximetry Type Intermittent   $ Pulse Oximetry - Multiple Charge Pulse Oximetry - Multiple

## 2017-07-31 NOTE — PLAN OF CARE
Problem: Patient Care Overview  Goal: Plan of Care Review  Outcome: Ongoing (interventions implemented as appropriate)  Hourly/Q2 hourly rounds completed through out this shift. Patient on comfort measures. Martinez in place flowing to gravity. Dental soft diet maintained. Oral care provided. Patient is disoriented to time and place. Turned every two hours. Patient has remained free from fall injury.Side rails up X2, bed in lowest, locked position, needs attended to,  call light within reach will continue to monitor.

## 2017-07-31 NOTE — PROGRESS NOTES
I received a call from [facility] [phone], they are currently having phone problems however they have received the referral and are going to call the pts sister. Daisy Lozano LMSW

## 2017-07-31 NOTE — PROGRESS NOTES
07/30/17 2112   Patient Assessment/Suction   Level of Consciousness (AVPU) alert   HARRISON Breath Sounds crackles coarse   PRE-TX-O2-ETCO2   O2 Device (Oxygen Therapy) High Flow nasal Cannula   $ Is the patient on Oxygen? Yes   Flow (L/min) 5   SpO2 95 %   Pulse Oximetry Type Intermittent   $ Pulse Oximetry - Multiple Charge Pulse Oximetry - Multiple

## 2017-07-31 NOTE — PROGRESS NOTES
Progress Note  Cardiology    Admit Date: 7/26/2017   LOS: 5 days     Follow-up For:  CHF; RLL pneumonia / atelectasis/ aspiration.    Scheduled Meds:   Continuous Infusions:   PRN Meds:artificial tears, lorazepam, morphine, ziprasidone    Review of patient's allergies indicates:   Allergen Reactions    Bacitracin     Tuberculin     Tuberculin julianne test ppd        SUBJECTIVE:     Interval History: Patient looks improved ; wet sounding cough. No sob.    Review of Systems  Respiratory: positive for cough, negative for hemoptysis, sputum and stridor  Cardiovascular: negative for chest pressure/discomfort and dyspnea    OBJECTIVE:     Vital Signs (Most Recent)  Temp: 98.5 °F (36.9 °C) (07/31/17 0800)  Pulse: 95 (07/31/17 0800)  Resp: 20 (07/31/17 0800)  BP: (!) 154/70 (07/31/17 0800)  SpO2: (!) 94 % (07/31/17 1114)    Vital Signs Range (Last 24H):  Temp:  [98.2 °F (36.8 °C)-99 °F (37.2 °C)]   Pulse:  [87-95]   Resp:  [20-23]   BP: (135-154)/(63-70)   SpO2:  [90 %-96 %]       Physical Exam:  Neck: no carotid bruit, no JVD and supple, symmetrical, trachea midline  Lungs: rales bibasilar  Heart: regular rate and rhythm, S1, S2 normal, no murmur, click, rub or gallop  Abdomen: soft, non-tender; bowel sounds normal; no masses,  no organomegaly  Extremities: Extremities normal, atraumatic, no cyanosis, clubbing, or edema    No results found for this or any previous visit (from the past 24 hour(s)).    Diagnostic Results:  Labs: Reviewed    ASSESSMENT/PLAN:     No labs. WBC elevated last. Improved overall.    Plan: Lasix iv .

## 2017-07-31 NOTE — PROGRESS NOTES
Progress Note  Hospital Medicine  Patient Name:Pham Weinberg  MRN:  [id number]  Patient Class: IP- Inpatient  Admit Date: 7/26/2017  Length of Stay: 5 days  Expected Discharge Date:   Attending Physician: Ara Wilkinson MD  Primary Care Provider:  Freddie Johnson MD    SUBJECTIVE:     Principal Problem: Acute respiratory failure with hypoxia and hypercarbia  Initial history of present illness: Patient is a 77 y.o. female admitted to Hospitalist Service from [facility] in ICU with complaint of altered mental status. Patient reportedly has past medical history significant for dementia, Schizophrenia, hypertension, hypothyroidism, CAD and unspecified cancer history. Per nursing home papers, patient is DNR. Patient is a resident of [facility]. Patient presented with EMS with complaint of altered mental status and SOB. Patient is not able to communicate. No family present at this time. Reportedly, patient's oxygen sats were in 50s. Reportedly, patient had been confused at nursing home for several days and she was accepted a a local [facility] facility for management but noted to have low sats today. Presently, patient is minimally responsive and have been placed on BiPAP and is going to ICU for further management. Patient was last month hospitalized for pneumonia.    PMH/PSH/SH/FH/Meds: reviewed.    Symptoms/Review of Systems: On comfort care. In good spirits.  Diet:  Dental soft  Activity level: Up with assistance  Pain:  Patient reports no pain.       OBJECTIVE:   Vital Signs (Most Recent):      Temp: 99 °F (37.2 °C) (07/31/17 0500)  Pulse: 89 (07/31/17 0500)  Resp: 20 (07/31/17 0500)  BP: (!) 141/63 (07/31/17 0500)  SpO2: (!) 90 % (07/31/17 0500)       Vital Signs Range (Last 24H):  Temp:  [98.2 °F (36.8 °C)-99 °F (37.2 °C)]   Pulse:  [83-94]   Resp:  [20-38]   BP: (110-155)/()   SpO2:  [89 %-98 %]     Weight: 74.9 kg (165 lb 2 oz)  Body mass index is 28.34 kg/m².    Intake/Output  Summary (Last 24 hours) at 07/31/17 0906  Last data filed at 07/30/17 1800   Gross per 24 hour   Intake              590 ml   Output              350 ml   Net              240 ml     Physical Examination:  General appearance: well developed, appears stated age  Head: normocephalic, atraumatic  Eyes:  conjunctivae/corneas clear. PERRL.  Nose: Nares normal. Septum midline.  Throat: lips, mucosa, and tongue normal; teeth and gums normal, no throat erythema.  Neck: supple, symmetrical, trachea midline, + JVD and thyroid not enlarged, symmetric, no tenderness/mass/nodules  Lungs: Fine bi-basal rales  Chest wall: no tenderness  Heart: regular rate and rhythm, S1, S2 normal, no murmur, click, rub or gallop  Abdomen: soft, non-tender non-distented; bowel sounds normal; no masses,  no organomegaly  Extremities: no cyanosis, clubbing, 1+ edema.   Pulses: 2+ and symmetric  Skin: Skin color, texture, turgor normal. No rashes or lesions.  Lymph nodes: Cervical, supraclavicular, and axillary nodes normal.  Neurologic: Grossly non-focal, lethargic.    CBC:    Recent Labs  Lab 07/28/17  0443 07/29/17  0442 07/30/17 0448   WBC 13.90* 15.80* 22.30*   RBC 3.65* 3.77* 3.79*   HGB 10.7* 11.0* 11.0*   HCT 32.0* 33.8* 34.1*    162 217   MCV 88 90 90   MCH 29.5 29.2 29.2   MCHC 33.5 32.6 32.4   BMP    Recent Labs  Lab 07/28/17 0443 07/29/17 0442 07/30/17 0448   * 107 101    143 143   K 3.4* 3.6 3.2*   CL 98 98 95   CO2 36* 34* 37*   BUN 17 22 24*   CREATININE 0.9 1.0 1.1   CALCIUM 8.6* 8.8 8.9      Diagnostic Results:  Microbiology Results (last 7 days)     Procedure Component Value Units Date/Time    Culture, Respiratory with Gram Stain [[id number]]     Order Status:  Canceled Specimen:  Respiratory from Sputum, Induced          Chest X-Ray: Moderate cardiomegaly increased from prior study.  Prominence of the pulmonary vasculature and faint infiltrates at the lung bases may represent pulmonary edema.     CT head:  Negative Head CT.     ECHO (06-23-17):    1 - Eccentric hypertrophy.     2 - No wall motion abnormalities.     3 - Hyperdynamic left ventricular systolic function (EF 65-70%).     4 - Normal left ventricular diastolic function.     5 - Right ventricular enlargement with hyperdynamic systolic function.     6 - The estimated PA systolic pressure is greater than 26 mmHg.     7 - Difficult windows.     CXR: Interval increase in alveolar opacity in the right lung suggestive of pneumonia or aspiration. There is a background of pulmonary edema which is likely cardiogenic. Small pleural effusions are also noted.    Assessment/Plan:      *Acute respiratory failure with hypoxia and hypercarbia [J96.01, J96.02]  Use supplemental oxygen to maintain PO2 above 92%.  Use BiPAP as needed. Follow pulmonary recommendations.   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     Yes    Schizophrenia [F20.9]  Closely monitor mental status.      Yes    Hypothyroidism [E03.9]  Chronic problem. Will continue chronic medications and monitor for any changes, adjusting as needed.     Yes    Acute diastolic heart failure [I50.31]  Supplemental O2 via nasal canula; titrate O2 saturation to >92%.     Yes    Type 2 diabetes mellitus with ophthalmic complication, without long-term current use of insulin [E11.39]  Continue American Diabetic Association 1800 Kcal diet.     Yes    Dementia without behavioral disturbance [F03.90]  Dementia is controlled currently. Continue home dementia meds and non-pharmacologic interventions to prevent delirium (No VS between 11PM-5AM, activity during day, opening blinds, providing glasses/hearing aids, and up in chair during daytime). Use PRN anti-psychotics to prevent behavior of self harm during sundowning, and avoid narcotics and benzos unless absolutely necessary. PRN anti-psychotics prescribed to avoid self harm behaviors.     Yes    Symptomatic  anemia - Iron deficiency type[D64.9] s/p 2 PRBC      Yes    Over the weekend, patient is placed on comfort care. Will abide by their wishes. Discussed with  who is assisting patient's family with NH hospice arrangements.   Ara Wilkinson MD  Department of Hospital Medicine   [facility]

## 2017-07-31 NOTE — PROGRESS NOTES
Received a call form Jolie at [facility] [phone] , however when I call back no one answers. I left a message for Lakshmi at [phone]. Daisy Lozano LMSW

## 2017-07-31 NOTE — PT/OT/SLP EVAL
Speech Language Pathology  Evaluation    Pham Weinberg   MRN: [id number]   Admitting Diagnosis: Acute respiratory failure with hypoxia and hypercarbia    Diet recommendations: Solid Diet Level: NPO  Liquid Diet Level: NPO     SLP Treatment Date: 07/31/17  Speech Start Time: 1113     Speech Stop Time: 1125     Speech Total (min): 12 min       TREATMENT BILLABLE MINUTES:  Eval Swallow and Oral Function 12    Diagnosis: Acute respiratory failure with hypoxia and hypercarbia      Past Medical History:   Diagnosis Date    Arthritis     Cancer     Coronary artery disease     Dementia     Fractures     lt humerus    Hypertension     Schizophrenia     Thyroid disease      Past Surgical History:   Procedure Laterality Date    APPENDECTOMY      colon mass removal    tonsillectomy                General Precautions: Standard, aspiration    Current Respiratory Status: nasal cannula     Social Hx: Patient [facility] at [facility].    Prior diet: NPO.        Subjective:  I want to talk to you.  Can I have a drink?  Patient goals: a drink         Objective:   Patient found with: oxygen.  Alert & cooperative.  Hx dysphagia aspiration PNA , but noncompliant as often as she can be per NH staff.      CXR yesterday - Small, layering left pleural effusion is noted. A small, layering right pleural effusion is noted. There is stable alveolar consolidation in the right upper, mid and lower lung zones    Oral Musculature Evaluation  Oral Musculature: WFL  Dentition: edentulous  Mucosal Quality: adequate  Mandibular Strength and Mobility: WFL  Oral Labial Strength and Mobility: WFL  Lingual Strength and Mobility: WFL  Buccal Strength and Mobility: WFL  Volitional Cough: congested, weak  Volitional Swallow: larygneal elevation appears adequate for airway protection upon palpation  Voice Prior to PO Intake: clear     Bedside Swallow Eval:  Consistencies Assessed: Thin liquids via cup sip, Puree via spoon and Solids 1/2 cookie   Oral  "Phase: WFL  Pharyngeal Phase: coughing/choking and wet vocal quality after swallow    Additional Treatment:      FIM:                                 Assessment:  Pham Weinberg is a 77 y.o. female with a medical diagnosis of Acute respiratory failure with hypoxia and hypercarbia, prior dysphagia & PNA with non-compliance with recommendations at NH, and presents with strong s/s pharyngeal dysphagia on all trials.  Altho pt is now on comfort care, based on the results of trials we cannot recommend any "safest" po textures or consistencies.          Discharge recommendations:       Goals:    SLP Goals     Not on file          Multidisciplinary Problems (Resolved)        Problem: SLP Goal    Goal Priority Disciplines Outcome   SLP Goal   (Resolved)     SLP Outcome(s) achieved   Description:    1) Pt will participate in ongoing swallow assessment                     Plan:   Plan of Care reviewed with: patient  SLP Follow-up?: Ling Garcia CCC-SLP/A  07/31/2017            "

## 2017-07-31 NOTE — PROGRESS NOTES
I sent a 3 day placement packet and current [place] [facility] [facility]. Daisy Lozano LMSW

## 2017-07-31 NOTE — PLAN OF CARE
Spoke with the pt's sisterEffie to let her know the pt was going to be returning to [facility] with [facility] for comfort care and she would need to sign the consents. She states that the pt is not with hospice but on comfort care in the hospital and that's how they wanted her to return to [facility]. I explained that hospice care and comfort care are the same concept but the [facility] homes have to use a hospice company to provide services. Effie is agreeable to the pt returning to [facility].   I received a phone call with both pt's sister, Effie and her daughter on the phone for me to explain the discharge plan for the pt. After much explanation the niece states that they are ok with hospice but they do not want her to have Morphine.  I asked KENDRICK Villa to have [facility] call the pt's sister and niece to discuss the care the pt would be given at [facility].   Once the pt's family and [facility]'s have spoken and come to an agreement the pt will return to [facility] under long term care with hospice services.....OLIVIA Harper CM

## 2017-07-31 NOTE — PLAN OF CARE
Problem: SLP Goal  Goal: SLP Goal    1) Pt will participate in ongoing swallow assessment   Outcome: Outcome(s) achieved Date Met: 07/31/17  Pt now on nasal cannula with comfort care & oral diet of dental soft ordered.  Trial of puree produced wet, gurgly voicing/breathing & coughing.  Trial of 1/2 cookie produced prolonged coughing.  Trial of thin liquid was followed by prolonged coughing.

## 2017-08-01 NOTE — PROGRESS NOTES
I sent dc orders and AVS to [facility] and [facility] via [facility]. Daisy Lozano LMSW

## 2017-08-01 NOTE — PROGRESS NOTES
Progress Note  Cardiology    Admit Date: 7/26/2017   LOS: 6 days     Follow-up For: CHF; anaemia; effusion / atelectasis/ pneumonia    Scheduled Meds:   Continuous Infusions:   PRN Meds:artificial tears, lorazepam, morphine, ziprasidone    Review of patient's allergies indicates:   Allergen Reactions    Bacitracin     Tuberculin     Tuberculin julianne test ppd        SUBJECTIVE:     Interval History: Patient has complaints cough.    Review of Systems  Respiratory: positive for cough, negative for hemoptysis, sputum and wheezing  Cardiovascular: negative for chest pressure/discomfort and dyspnea    OBJECTIVE:     Vital Signs (Most Recent)  Temp: 98.2 °F (36.8 °C) (08/01/17 0800)  Pulse: 97 (08/01/17 0807)  Resp: (!) 24 (08/01/17 0807)  BP: (!) 165/71 (08/01/17 0800)  SpO2: (!) 91 % (08/01/17 0807)    Vital Signs Range (Last 24H):  Temp:  [97.9 °F (36.6 °C)-98.2 °F (36.8 °C)]   Pulse:  []   Resp:  [20-24]   BP: (129-165)/(58-71)   SpO2:  [88 %-96 %]       Physical Exam:  Neck: no carotid bruit, no JVD and supple, symmetrical, trachea midline  Lungs: rales bibasilar, rhonchi diminished BS in right base  Heart: S1, S2 normal and systolic murmur: early systolic 1/6, medium pitch at 2nd left intercostal space, at 2nd right intercostal space, at lower left sternal border  Abdomen: soft, non-tender; bowel sounds normal; no masses,  no organomegaly  Extremities: Extremities normal, atraumatic, no cyanosis, clubbing, or edema    No results found for this or any previous visit (from the past 24 hour(s)).    Diagnostic Results:  no lab    ASSESSMENT/PLAN:     Pt appears to be doing fairly well clinically. Lasix 20 mg iv x 1 today. Please call if any change in plans / labs / Xray etc if planned. Ty.

## 2017-08-01 NOTE — PROGRESS NOTES
Geodon given at this time.  Pt increasingly confused and agitated.  Not responding to reorientation.  Calling nurses station repeatedly and demanding that we bring her back to her room.  Hollering out to everyone that passes in from of her door.  Will continue to monitor pt

## 2017-08-01 NOTE — PLAN OF CARE
Problem: Patient Care Overview  Goal: Plan of Care Review  Outcome: Ongoing (interventions implemented as appropriate)  HFNC 10 lpm with sats 91%

## 2017-08-01 NOTE — PROGRESS NOTES
Prior to the pts discharging (home with personal vehicle) the pts nurse needs to call Beulah at [facility] [phone] Opt 2 in order to have the [facility] nurse go out to the home. Daisy Lozano LMSW

## 2017-08-01 NOTE — PROGRESS NOTES
I spoke to Mirtha at [facility] [phone] and she is currently meeting with the family. She will call me back when they are ready for report. Daisy Lozano LMSW

## 2017-08-01 NOTE — PROGRESS NOTES
I left a message for Jolie with [facility], [phone] to return my call.    9:20 am Per Jolie, Family is meeting tomorrow at [facility] at 1:00 pm in [place] and the facility is aware of that. She stated that if the pt needs to return before then that is ok. If the pt discharges before then she would like to receive a call in order to get report from our nurses rather than sending her nurses in to the nursing home without any info.  She will also like discharge orders faxed to them.  CM will speak to Mirtha at [facility] regarding the pts ability to return. Daisy Lozano LMSW

## 2017-08-01 NOTE — DISCHARGE SUMMARY
Discharge Summary  Hospital Medicine    Admit Date: 7/26/2017    Date and Time: 8/1/20179:19 AM    Discharge Attending Physician: Ara Wilkinson MD    Primary Care Physician: Freddie Johnson MD    Diagnoses:  Active Hospital Problems    Diagnosis  POA    *Acute respiratory failure with hypoxia and hypercarbia [J96.01, J96.02]  Yes    Comfort measures only status [Z51.5]  Not Applicable    Acute on chronic congestive heart failure [I50.9]  Yes    Acute pulmonary edema [J81.0]  Yes    Aspiration pneumonia [J69.0]  Yes    Hypoxia [R09.02]  Yes    Acute confusional state - likely metbolic encephalopathy [F05]  Yes    Schizophrenia [F20.9]  Yes    Hypothyroidism [E03.9]  Yes    Acute diastolic heart failure [I50.31]  Yes    Type 2 diabetes mellitus with ophthalmic complication, without long-term current use of insulin [E11.39]  Yes    Dementia without behavioral disturbance [F03.90]  Yes    Symptomatic anemia [D64.9]  Yes      Resolved Hospital Problems    Diagnosis Date Resolved POA    TRALI (transfusion related acute lung injury) [J95.84] 07/29/2017 No     Discharged Condition: Good    Hospital Course:   Patient is a 77 y.o. female admitted to Hospitalist Service from [facility] Emergency Room in ICU with complaint of altered mental status. Patient reportedly has past medical history significant for dementia, Schizophrenia, hypertension, hypothyroidism, CAD and unspecified cancer history. Per nursing home papers, patient is DNR. Patient is a resident of [facility]. Patient presented with EMS with complaint of altered mental status and SOB. Patient was not able to communicate. Reportedly, patient's oxygen sats were in 50s. Reportedly, patient had been confused at nursing home for several days and she was accepted a a local psych facility for management but noted to have low sats. Patient was minimally responsive and had been placed on BiPAP and is going to ICU for further management.  Patient was last month hospitalized for pneumonia. Patient was admitted to Hospitalist medicine service. Patient was managed in ICU. Patient required BiPAP therapy. Patient was treated with IV antibiotic and also managed for CHF. Patient sister who is next of kin after discussion with Karen Snowden requested comfort care. Symptoms improved. Patient was discharged to [facility] hospice in stable condition with following discharge plan of care. Total time with the patient was 30 minutes and greater than 50% was spent in counseling and coordination of care. The assessment and plan have been discussed at length. Physicians' notes reviewed. Labs and procedure reviewed.     Consults: Dr. Virk and Dr. LOLY Hicks    Significant Diagnostic Studies:   Chest X-Ray: Moderate cardiomegaly increased from prior study.  Prominence of the pulmonary vasculature and faint infiltrates at the lung bases may represent pulmonary edema.     CT head: Negative Head CT.     ECHO (06-23-17):    1 - Eccentric hypertrophy.     2 - No wall motion abnormalities.     3 - Hyperdynamic left ventricular systolic function (EF 65-70%).     4 - Normal left ventricular diastolic function.     5 - Right ventricular enlargement with hyperdynamic systolic function.     6 - The estimated PA systolic pressure is greater than 26 mmHg.     7 - Difficult windows.      CXR: Interval increase in alveolar opacity in the right lung suggestive of pneumonia or aspiration. There is a background of pulmonary edema which is likely cardiogenic. Small pleural effusions are also noted.    Microbiology Results (last 7 days)     Procedure Component Value Units Date/Time    Culture, Respiratory with Gram Stain [[id number]]     Order Status:  Canceled Specimen:  Respiratory from Sputum, Induced         Special Treatments/Procedures: None  Disposition: Nursing home hospice    Medications:  Reconciled Home Medications: Current Discharge Medication List      CONTINUE these medications which  have NOT CHANGED    Details   acetaminophen (TYLENOL) 325 MG tablet Take 650 mg by mouth every 6 (six) hours as needed for Pain.      albuterol-ipratropium 2.5mg-0.5mg/3mL (DUONEB) 0.5 mg-3 mg(2.5 mg base)/3 mL nebulizer solution Take 3 mLs by nebulization every 6 (six) hours as needed for Wheezing or Shortness of Breath. Rescue      aspirin 81 MG Chew Take 81 mg by mouth once daily.       bimatoprost (LUMIGAN) 0.03 % ophthalmic drops Place 1 drop into both eyes every evening.       !! clonazePAM (KLONOPIN) 0.5 MG tablet Take 0.5 mg by mouth every evening.      !! clonazePAM (KLONOPIN) 1 MG tablet Take 1 mg by mouth every morning.      docusate sodium (COLACE) 100 MG capsule Take 100 mg by mouth 2 (two) times daily.      donepezil (ARICEPT) 10 MG tablet Take 10 mg by mouth every evening.      ferrous sulfate 325 (65 FE) MG EC tablet Take 325 mg by mouth 2 (two) times daily.      furosemide (LASIX) 40 MG tablet Take 40 mg by mouth once daily.      Lactobacillus acidophilus (ACIDOPHILUS) Cap Take 2 capsules by mouth 2 (two) times daily.      lactulose (CHRONULAC) 20 gram/30 mL Soln Take 20 g by mouth 2 (two) times daily.      levothyroxine (SYNTHROID) 100 MCG tablet Take 100 mcg by mouth before breakfast.       loratadine (CLARITIN) 10 mg tablet Take 10 mg by mouth daily as needed for Allergies.      magnesium hydroxide, CONCENTRATE, (MILK OF MAGNESIA CONCENTRATED) 2,400 mg/10 mL Susp Take 10 mLs by mouth once a week. Wednesday      magnesium oxide (MAG-OX) 400 mg tablet Take 400 mg by mouth once daily.      metformin (GLUCOPHAGE) 1000 MG tablet Take 1,000 mg by mouth 2 (two) times daily with meals.      metoprolol tartrate (LOPRESSOR) 25 MG tablet Take 25 mg by mouth 2 (two) times daily.        olanzapine (ZYPREXA) 10 MG tablet Take 10 mg by mouth 2 (two) times daily.      omeprazole (PRILOSEC) 20 MG capsule Take 20 mg by mouth once daily.      oxybutynin (DITROPAN-XL) 5 MG TR24 Take 5 mg by mouth once daily.       potassium chloride SA (K-DUR,KLOR-CON) 20 MEQ tablet Take 20 mEq by mouth 2 (two) times daily.        quetiapine (SEROQUEL) 100 MG Tab Take 150 mg by mouth every evening.      sertraline (ZOLOFT) 50 MG tablet Take 50 mg by mouth once daily.       !! - Potential duplicate medications found. Please discuss with provider.          Discharge Procedure Orders  Diet general   Order Comments: Cardiac/ 2 gram sodium low cholesterol diet     Other restrictions (specify):   Order Comments: Fall precautions     Call MD for:   Order Comments: For worsening symptoms, chest pain, shortness of breath, increased abdominal pain, high grade fever, stroke or stroke like symptoms, immediately go to the nearest Emergency Room or call 911 as soon as possible.       Follow-up Information     [facility].    Specialties:  SNF Agency, Allergy, Allergy  Why:  half-way, Nursing Home           [facility].    Specialties:  Hospice Services, Hospice and Palliative Medicine  Why:  Hospice  Contact information:  [address] [address] [phone]             Freddie Johnson MD.    Specialty:  General Practice  Why:  As needed  Contact information:  [address] [place] [address] [phone]

## 2017-08-01 NOTE — PROGRESS NOTES
Per Mirtha at [facility] , report can be called to Jean at [facility] [phone]. The pt will transport via ambulance due to requiring 10L of O2. I updated the pts nurse. Daisy Lozano LMSW

## 2017-08-01 NOTE — PROGRESS NOTES
Per Mirtha at [facility] [phone], she has received the dc orders and has the pts sister coming in at 1:00 to sign a waiver to eat form. She will call me back with an update as she wants that completed before she returns. Daisy Lozano LMSW

## 2017-08-02 NOTE — PLAN OF CARE
08/02/17 0744   Final Note   Assessment Type Final Discharge Note   Discharge Disposition retirement Nu   Discharge planning education complete? Yes

## 2017-08-02 NOTE — PLAN OF CARE
POC reviewed with pt, pt verbalized understanding. Pt oriented to self and place.  Safety maintained throughout shift, bed locked and in lowest position, call light in reach, Side rail up X 2, bed alarm set. Pt remained free of fall/trauma. VSS, afebrile this shift. Pt denies pain throughout shift. Pt remained in bed throughout shift. Blanchable redness noted on vaginal area and buttocks upon assessment. Pictures entered into chart prior to discharge. Martinez discontinued. Discharge instruction, follow up appointments and medication instructions given to Nurse at [facility]. Nurse at [facility] informed of skin conditions and pictures before departure, Nurse verbalized understanding.  IV discontinued, site asymptomatic, pressure dressing applied. EMS transporting pt to [facility].  All item gathered and taken at the time of discharge.

## 2017-08-03 PROBLEM — J18.9 PNEUMONIA OF BOTH LUNGS DUE TO INFECTIOUS ORGANISM: Status: ACTIVE | Noted: 2017-01-01

## 2017-08-03 NOTE — ED PROVIDER NOTES
"Encounter Date: 8/3/2017    SCRIBE #1 NOTE: I, Carolin Beltran, am scribing for, and in the presence of, Dr. Kinsey.       History     Chief Complaint   Patient presents with    Shortness of Breath    Fatigue       08/03/2017 12:22 PM     Chief Complaint: Respiratory distress      Pham Weinberg is a 77 y.o. female with a history of HTN, dementia, CAD, schizophrenia, who presents to the ED from [facility] via EMS for respiratory distress that started this morning. The pt was recently seen at [place] for aspiration pneumonia. On EMS arrival, the pt was in respiratory distress and oxygen saturation was 94%. She was given albuterol breathing treatment and solumedrol en route. The pt denies pain or SOB. Pt is DNR.  History is limited due to the condition of the patient.       The history is provided by the EMS personnel. The history is limited by the condition of the patient.     Review of patient's allergies indicates:   Allergen Reactions    Bacitracin     Tuberculin     Tuberculin julianne test ppd      Past Medical History:   Diagnosis Date    Arthritis     Cancer     Coronary artery disease     Dementia     Fractures     lt humerus    Hypertension     Schizophrenia     Thyroid disease      Past Surgical History:   Procedure Laterality Date    APPENDECTOMY      colon mass removal    tonsillectomy       Family History   Problem Relation Age of Onset    Anesthesia problems Neg Hx     Clotting disorder Neg Hx      Social History   Substance Use Topics    Smoking status: Unknown If Ever Smoked    Smokeless tobacco: Never Used    Alcohol use No     Review of Systems   Unable to perform ROS: Dementia       Physical Exam     Initial Vitals   BP Pulse Resp Temp SpO2   -- -- -- -- --      MAP       --       /67   Pulse 75   Temp 97.4 °F (36.3 °C) (Axillary)   Resp 20   Ht 5' 4" (1.626 m)   Wt 74.8 kg (165 lb)   SpO2 (!) 94%   BMI 28.32 kg/m²     Physical Exam    Nursing note and " vitals reviewed.  Constitutional: She appears well-developed and well-nourished. She appears lethargic. She is not diaphoretic. No distress.   Pt is lethargic, but arousable.    HENT:   Head: Normocephalic and atraumatic.   Eyes: EOM are normal. Pupils are equal, round, and reactive to light.   Neck: Normal range of motion. Neck supple.   Cardiovascular: Normal rate and regular rhythm. Exam reveals no gallop and no friction rub.    No murmur heard.  Pulmonary/Chest: Tachypnea noted. She has rhonchi.   Coarse bilateral rhonchi to all lung fields.   Abdominal: Soft. She exhibits no distension. There is no tenderness. There is no rebound and no guarding.   Musculoskeletal: Normal range of motion. She exhibits no edema or tenderness.   Neurological: She appears lethargic.   Skin: Skin is warm and dry. No rash noted. No erythema.   Psychiatric: She has a normal mood and affect. Her behavior is normal. Judgment and thought content normal.         ED Course   Critical Care  Date/Time: 8/3/2017 3:15 PM  Performed by: JOSE CRUZ WEEKS III  Authorized by: JOSE CRUZ WEEKS III   Direct patient critical care time: 10 minutes  Additional history critical care time: 5 minutes  Ordering / reviewing critical care time: 5 minutes  Documentation critical care time: 5 minutes  Consulting other physicians critical care time: 5 minutes  Total critical care time (exclusive of procedural time) : 30 minutes  Critical care was necessary to treat or prevent imminent or life-threatening deterioration of the following conditions: sepsis and respiratory failure.  Critical care was time spent personally by me on the following activities: discussions with consultants, evaluation of patient's response to treatment, obtaining history from patient or surrogate, pulse oximetry, review of old charts, re-evaluation of patient's condition, ordering and review of radiographic studies, ordering and performing treatments and interventions, examination of  patient and development of treatment plan with patient or surrogate.  Comments: Patient required numerous evaluations of her cardiopulmonary status and shocklike status during her course in the emergency department for her life-threatening respiratory distress with an x-ray that shows almost bilateral white outs and admitted to the intensive care unit        Labs Reviewed   B-TYPE NATRIURETIC PEPTIDE - Abnormal; Notable for the following:        Result Value    BNP 1,039 (*)     All other components within normal limits   CBC W/ AUTO DIFFERENTIAL - Abnormal; Notable for the following:     RBC 3.67 (*)     Hemoglobin 10.6 (*)     Hematocrit 33.0 (*)     RDW 18.0 (*)     MPV 9.0 (*)     Lymph # 0.8 (*)     Gran% 80.3 (*)     Lymph% 9.6 (*)     All other components within normal limits   COMPREHENSIVE METABOLIC PANEL - Abnormal; Notable for the following:     Chloride 93 (*)     CO2 39 (*)     Glucose 126 (*)     Albumin 2.1 (*)     AST 73 (*)     All other components within normal limits   URINALYSIS - Abnormal; Notable for the following:     Occult Blood UA Trace (*)     Nitrite, UA Positive (*)     All other components within normal limits   URINALYSIS MICROSCOPIC - Abnormal; Notable for the following:     Bacteria, UA Many (*)     Hyaline Casts, UA 2 (*)     All other components within normal limits   CULTURE, BLOOD   CULTURE, BLOOD   CULTURE, URINE   LACTIC ACID, PLASMA   TROPONIN I             Medical Decision Making:   History:   Old Medical Records: I decided to obtain old medical records.  Initial Assessment:   DDx includes: sepsis, pneumonia, aspiration, CHF. Pt with respiratory distress that started this morning. May have aspirated. Will perform cardiac and septic work up and reevaluate.  Independently Interpreted Test(s):   I have ordered and independently interpreted X-rays - see summary below.       <> Summary of X-Ray Reading(s): CXR: bilateral pneumonia  I have ordered and independently interpreted EKG  Reading(s) - see summary below       <> Summary of EKG Reading(s): EKG: nsr at 74, nl axis, nl intervals, no st elevation or depression  Clinical Tests:   Lab Tests: Ordered and Reviewed  Radiological Study: Ordered and Reviewed  Medical Tests: Ordered and Reviewed  Other:   I have discussed this case with another health care provider.       <> Summary of the Discussion: Internal medicine  Patient with significant respiratory distress upon arrival.  Chest x-ray shows bilateral infiltrates versus ARDS versus CHF.  Unclear etiology.  Hospital-acquired pneumonia antibiotics started.  We'll admit to ICU            Scribe Attestation:   Scribe #1: I performed the above scribed service and the documentation accurately describes the services I performed. I attest to the accuracy of the note.    Attending Attestation:           Physician Attestation for Scribe:  Physician Attestation Statement for Scribe #1: I, Dr. Kinsey, reviewed documentation, as scribed by Carolin Beltran in my presence, and it is both accurate and complete.                 ED Course     Clinical Impression:   The primary encounter diagnosis was Pneumonia of both lungs due to infectious organism, unspecified part of lung. Diagnoses of SOB (shortness of breath) and Acute on chronic congestive heart failure, unspecified congestive heart failure type were also pertinent to this visit.                           Arsh Kinsey III, MD  08/03/17 4348

## 2017-08-04 NOTE — CONSULTS
Pham Weinberg [id number] is a 77 y.o. female who has been consulted for vancomycin dosing.    The patient has the following labs:     Date Creatinine (mg/dl)    BUN WBC Count   8/3/2017 Estimated Creatinine Clearance: 52.6 mL/min (based on Cr of 0.9). Lab Results   Component Value Date    BUN 14 08/03/2017     Lab Results   Component Value Date    WBC 8.20 08/03/2017        Current weight is 74.8 kg (165 lb)      The patient received  1500 mg on 8/3 at 1425.    The patient will be started on vancomycin at a dose of 1250 mg every 24 hours. The vancomycin trough has been ordered for 8/5 at 1330.  Target trough goal is 15 to 20.    Patient will be followed by pharmacy for changes in renal function, toxicity, and efficacy.   Thank you for allowing us to participate in this patient's care.     Chris Cornell

## 2017-08-04 NOTE — PLAN OF CARE
Problem: Nutrition, Imbalanced: Inadequate Oral Intake (Adult)  Goal: Identify Related Risk Factors and Signs and Symptoms  Related risk factors and signs and symptoms are identified upon initiation of Human Response Clinical Practice Guideline (CPG)  Outcome: Ongoing (interventions implemented as appropriate)  Recommendations  Recommendation/Intervention: 1.) Depending on goals of care, recommend adult regular diet, texture per SLP.   2.) Enteral nutrition if required: recommend Isosource 1.5 @ 10 mls/hr advancing by 10 mls Q4 hrs to goal rate 40 mls/hr continuous providing 1440 kcals/day, 65 g protein/day, 169 g CHO/day, 733 mls water/day. Hold TF x4 hrs for residuals >250mls. Flush 120 mls Q4 hrs or per MD.   Goals: 1.) diet will advance within 96 hrs.   Nutrition Goal Status: new  Communication of SHERIN Recs: reviewed with RN

## 2017-08-04 NOTE — PROGRESS NOTES
Progress Note  Hospital Medicine  Patient Name:Pham Weinberg  MRN:  [id number]  Patient Class: IP- Inpatient  Admit Date: 8/3/2017  Length of Stay: 1 days  Expected Discharge Date:   Attending Physician: Ara Wilkinson MD  Primary Care Provider:  Freddie Johnson MD    SUBJECTIVE:     Principal Problem: Acute respiratory failure with hypoxia and hypercarbia  Initial history of present illness: Patient is a 77 y.o. female admitted to Hospitalist Service from [facility] in ICU with complaint of worsening SOB. Patient reportedly has past medical history significant for dementia, Schizophrenia, hypertension, hypothyroidism, CAD and unspecified cancer history. Patient was recently hospitalized from 07-26-17 to 08-01-17 related to aspiration pneumonia + CHF exacerbation.  Patient was discharged to nursing home with hospice. Reportedly, patient signed a waiver at NH to feed the patient and upon worsening is oxygen sats, revoked hospice but continuing with DNR. Patient is unable to provide history. On EMS arrival, the pt was in respiratory distress and oxygen saturation was 94%. She was given albuterol breathing treatment and solumedrol en route.     PMH/PSH/SH/FH/Meds: reviewed.    Symptoms/Review of Systems: Patient is on non-rebreather, frequent coughing no subjective complaints. No chest pain or headache, fever or abdominal pain.     Diet:  NPO  Activity level: Up with assistance  Pain:  Patient reports no pain.       OBJECTIVE:   Vital Signs (Most Recent):      Temp: 97.8 °F (36.6 °C) (08/04/17 0800)  Pulse: 93 (08/04/17 0815)  Resp: (!) 38 (08/04/17 0815)  BP: 131/62 (08/04/17 0800)  SpO2: 95 % (08/04/17 0815)       Vital Signs Range (Last 24H):  Temp:  [97.4 °F (36.3 °C)-98.2 °F (36.8 °C)]   Pulse:  [72-93]   Resp:  [20-39]   BP: (107-143)/(54-67)   SpO2:  [81 %-98 %]     Weight: 74.8 kg (165 lb)  Body mass index is 28.32 kg/m².    Intake/Output Summary (Last 24 hours) at 08/04/17  1028  Last data filed at 08/04/17 0600   Gross per 24 hour   Intake              300 ml   Output              750 ml   Net             -450 ml     Physical Examination:  General appearance: well developed, appears stated age, arousable and lethargic  Head: normocephalic, atraumatic  Eyes:  conjunctivae/corneas clear. PERRL.  Nose: Nares normal. Septum midline.  Throat: lips, mucosa, and tongue normal; teeth and gums normal, no throat erythema.  Neck: supple, symmetrical, trachea midline, no JVD and thyroid not enlarged, symmetric, no tenderness/mass/nodules  Lungs:  Scattered rhonchi B/L  Chest wall: no tenderness  Heart: regular rate and rhythm, S1, S2 normal, no murmur, click, rub or gallop  Abdomen: soft, non-tender non-distented; bowel sounds normal; no masses,  no organomegaly  Extremities: no cyanosis, clubbing or edema.   Pulses: 2+ and symmetric  Skin: Skin color, texture, turgor normal. No rashes or lesions.  Lymph nodes: Cervical, supraclavicular, and axillary nodes normal.  Neurologic: Grossly non-focal.    CBC:    Recent Labs  Lab 07/29/17  0442 07/30/17  0448 08/03/17  1308   WBC 15.80* 22.30* 8.20   RBC 3.77* 3.79* 3.67*   HGB 11.0* 11.0* 10.6*   HCT 33.8* 34.1* 33.0*    217 196   MCV 90 90 90   MCH 29.2 29.2 28.8   MCHC 32.6 32.4 32.1   BMP    Recent Labs  Lab 07/29/17  0442 07/30/17  0448 08/03/17  1308    101 126*    143 142   K 3.6 3.2* 3.7   CL 98 95 93*   CO2 34* 37* 39*   BUN 22 24* 14   CREATININE 1.0 1.1 0.9   CALCIUM 8.8 8.9 9.4      Diagnostic Results:  Microbiology Results (last 7 days)     Procedure Component Value Units Date/Time    Blood culture #1 **CANNOT BE ORDERED STAT** [[id number]] Collected:  08/03/17 1308    Order Status:  Completed Specimen:  Blood from Antecubital, Right  Arm Updated:  08/04/17 0315     Blood Culture, Routine No Growth to date    Blood culture #2 **CANNOT BE ORDERED STAT** [[id number]] Collected:  08/03/17 1307    Order Status:  Completed  Specimen:  Blood from Antecubital, Left  Arm Updated:  08/04/17 0315     Blood Culture, Routine No Growth to date    Urine culture **CANNOT BE ORDERED STAT** [[id number]] Collected:  08/03/17 1311    Order Status:  Sent Specimen:  Urine from Urine, Catheterized Updated:  08/03/17 2157         Assessment/Plan:      *Acute respiratory failure with hypoxia and hypercarbia [J96.01, J96.02]   Yes    Pneumonia of both lungs due to infectious organism [J18.9]   Yes    Aspiration pneumonia [J69.0]  Supplemental O2 via nasal canula; titrate O2 saturation to >92%.   Use BiPAP as needed.  Continue beta 2 agonist bronchodilator treatments.   Continue IV antibiotics.  Check sputum GS and Cx.   Continue routine medications as before.      Yes    Hypothyroidism [E03.9]  Chronic problem. Will continue chronic medications and monitor for any changes, adjusting as needed.     Yes    Dementia without behavioral disturbance [F03.90]  Dementia is controlled currently. Continue home dementia meds and non-pharmacologic interventions to prevent delirium (No VS between 11PM-5AM, activity during day, opening blinds, providing glasses/hearing aids, and up in chair during daytime). Use PRN anti-psychotics to prevent behavior of self harm during sundowning, and avoid narcotics and benzos unless absolutely necessary. PRN anti-psychotics prescribed to avoid self harm behaviors.     Yes    Essential hypertension [I10]  Chronic problem. Will continue chronic medications and monitor for any changes, adjusting as needed.         Yes    Acute diastolic heart failure [I50.31]  Lasix 40 mg IV now.  Supplemental O2 via nasal canula; titrate O2 saturation to >92%.   Monitor electrolytes for hypokalemia and hypomagnesemia.  Daily weights.  Strict I/Os.  Fluid restriction.  Na restriction <2g/d.        Discussed with patient's sister and . I had a long discussion with patient's other sister (POA) and discussed patient's current status and poor  condition. We discussed hospice care, comfort care etc. She will be discussing with family members. Time spent in care of the patient, counseling and coordination of care (Greater than 50% spent in direct face to face contact): 35 min.    VTE Risk Mitigation         Ordered     enoxaparin injection 40 mg  Every 24 hours (non-standard times)     Route:  Subcutaneous        08/03/17 6587     Low Risk of VTE  Once      08/03/17 1903        Ara Wilkinson MD  Department of Hospital Medicine   [facility]

## 2017-08-04 NOTE — H&P
PCP: Freddie Johnson MD    History & Physical    Chief Complaint: Worsening SOB    History of Present Illness:  Patient is a 77 y.o. female admitted to Hospitalist Service from [facility] in ICU with complaint of worsening SOB. Patient reportedly has past medical history significant for dementia, Schizophrenia, hypertension, hypothyroidism, CAD and unspecified cancer history. Patient was recently hospitalized from 07-26-17 to 08-01-17 related to aspiration pneumonia + CHF exacerbation.  Patient was discharged to nursing home with hospice. Reportedly, patient signed a waiver at NH to feed the patient and upon worsening is oxygen sats, revoked hospice but continuing with DNR. Patient is unable to provide history. On EMS arrival, the pt was in respiratory distress and oxygen saturation was 94%. She was given albuterol breathing treatment and solumedrol en route.       Past Medical History:   Diagnosis Date    Arthritis     Cancer     Coronary artery disease     Dementia     Fractures     lt humerus    Hypertension     Schizophrenia     Thyroid disease      Past Surgical History:   Procedure Laterality Date    APPENDECTOMY      colon mass removal    tonsillectomy       Family History   Problem Relation Age of Onset    Anesthesia problems Neg Hx     Clotting disorder Neg Hx      Social History   Substance Use Topics    Smoking status: Unknown If Ever Smoked    Smokeless tobacco: Never Used    Alcohol use No      Review of patient's allergies indicates:   Allergen Reactions    Bacitracin     Tuberculin     Tuberculin julianne test ppd      PTA Medications   Medication Sig    acetaminophen (TYLENOL) 325 MG tablet Take 650 mg by mouth every 6 (six) hours as needed for Pain.    albuterol-ipratropium 2.5mg-0.5mg/3mL (DUONEB) 0.5 mg-3 mg(2.5 mg base)/3 mL nebulizer solution Take 3 mLs by nebulization every 6 (six) hours as needed for Wheezing or Shortness of Breath. Rescue     aspirin 81 MG Chew Take 81 mg by mouth once daily.     bimatoprost (LUMIGAN) 0.03 % ophthalmic drops Place 1 drop into both eyes every evening.     clonazePAM (KLONOPIN) 0.5 MG tablet Take 0.5 mg by mouth every evening.    clonazePAM (KLONOPIN) 1 MG tablet Take 1 mg by mouth every morning.    docusate sodium (COLACE) 100 MG capsule Take 100 mg by mouth 2 (two) times daily.    donepezil (ARICEPT) 10 MG tablet Take 10 mg by mouth every evening.    ferrous sulfate 325 (65 FE) MG EC tablet Take 325 mg by mouth 2 (two) times daily.    furosemide (LASIX) 40 MG tablet Take 40 mg by mouth once daily.    Lactobacillus acidophilus (ACIDOPHILUS) Cap Take 2 capsules by mouth 2 (two) times daily.    lactulose (CHRONULAC) 20 gram/30 mL Soln Take 20 g by mouth 2 (two) times daily.    levothyroxine (SYNTHROID) 100 MCG tablet Take 100 mcg by mouth before breakfast.     loratadine (CLARITIN) 10 mg tablet Take 10 mg by mouth daily as needed for Allergies.    magnesium hydroxide, CONCENTRATE, (MILK OF MAGNESIA CONCENTRATED) 2,400 mg/10 mL Susp Take 10 mLs by mouth once a week. Wednesday    magnesium oxide (MAG-OX) 400 mg tablet Take 400 mg by mouth once daily.    metformin (GLUCOPHAGE) 1000 MG tablet Take 1,000 mg by mouth 2 (two) times daily with meals.    metoprolol tartrate (LOPRESSOR) 25 MG tablet Take 25 mg by mouth 2 (two) times daily.      olanzapine (ZYPREXA) 10 MG tablet Take 10 mg by mouth 2 (two) times daily.    omeprazole (PRILOSEC) 20 MG capsule Take 20 mg by mouth once daily.    oxybutynin (DITROPAN-XL) 5 MG TR24 Take 5 mg by mouth once daily.    potassium chloride SA (K-DUR,KLOR-CON) 20 MEQ tablet Take 20 mEq by mouth 2 (two) times daily.      quetiapine (SEROQUEL) 100 MG Tab Take 150 mg by mouth every evening.    sertraline (ZOLOFT) 50 MG tablet Take 50 mg by mouth once daily.     Review of Systems: Unable to obtain due to dementia     OBJECTIVE:     Vital Signs (Most Recent)  Temp: 97.4 °F (36.3  °C) (08/03/17 1222)  Pulse: 72 (08/03/17 1712)  Resp: 20 (08/03/17 1603)  BP: (!) 123/59 (08/03/17 1701)  SpO2: 96 % (08/03/17 1712)    Physical Exam:  General appearance: well developed, appears stated age, arousable and lethargic  Head: normocephalic, atraumatic  Eyes:  conjunctivae/corneas clear. PERRL.  Nose: Nares normal. Septum midline.  Throat: lips, mucosa, and tongue normal; teeth and gums normal, no throat erythema.  Neck: supple, symmetrical, trachea midline, no JVD and thyroid not enlarged, symmetric, no tenderness/mass/nodules  Lungs:  Scattered rhonchi B/L  Chest wall: no tenderness  Heart: regular rate and rhythm, S1, S2 normal, no murmur, click, rub or gallop  Abdomen: soft, non-tender non-distented; bowel sounds normal; no masses,  no organomegaly  Extremities: no cyanosis, clubbing or edema.   Pulses: 2+ and symmetric  Skin: Skin color, texture, turgor normal. No rashes or lesions.  Lymph nodes: Cervical, supraclavicular, and axillary nodes normal.  Neurologic: Grossly non-focal.    Laboratory:   CBC:   Recent Labs  Lab 08/03/17  1308   WBC 8.20   RBC 3.67*   HGB 10.6*   HCT 33.0*      MCV 90   MCH 28.8   MCHC 32.1     CMP:   Recent Labs  Lab 08/03/17  1308   *   CALCIUM 9.4   ALBUMIN 2.1*   PROT 7.3      K 3.7   CO2 39*   CL 93*   BUN 14   CREATININE 0.9   ALKPHOS 124   ALT 34   AST 73*   BILITOT 0.8       Recent Labs  Lab 08/03/17  1308   TROPONINI 0.011     Microbiology Results (last 7 days)     Procedure Component Value Units Date/Time    Blood culture #2 **CANNOT BE ORDERED STAT** [[id number]] Collected:  08/03/17 1307    Order Status:  Sent Specimen:  Blood from Antecubital, Left  Arm Updated:  08/03/17 1803    Blood culture #1 **CANNOT BE ORDERED STAT** [[id number]] Collected:  08/03/17 1308    Order Status:  Sent Specimen:  Blood from Antecubital, Right  Arm Updated:  08/03/17 1807    Urine culture **CANNOT BE ORDERED STAT** [[id number]] Collected:  08/03/17 1311    Order  Status:  Sent Specimen:  Urine from Urine, Catheterized Updated:  08/03/17 1759          Recent Labs  Lab 08/03/17  1311   COLORU Yellow   SPECGRAV 1.010   PHUR 6.0   PROTEINUA Negative   BACTERIA Many*   NITRITE Positive*   LEUKOCYTESUR Negative   UROBILINOGEN Negative   HYALINECASTS 2*       Hemoglobin A1C   Date Value Ref Range Status   06/23/2017 5.2 4.0 - 5.6 % Final     Comment:     According to ADA guidelines, hemoglobin A1c <7.0% represents  optimal control in non-pregnant diabetic patients. Different  metrics may apply to specific patient populations.   Standards of Medical Care in Diabetes-2016.  For the purpose of screening for the presence of diabetes:  <5.7%     Consistent with the absence of diabetes  5.7-6.4%  Consistent with increasing risk for diabetes   (prediabetes)  >or=6.5%  Consistent with diabetes  Currently, no consensus exists for use of hemoglobin A1c  for diagnosis of diabetes for children.  This Hemoglobin A1c assay has significant interference with fetal   hemoglobin   (HbF). The results are invalid for patients with abnormal amounts of   HbF,   including those with known Hereditary Persistence   of Fetal Hemoglobin. Heterozygous hemoglobin variants (HbAS, HbAC,   HbAD, HbAE, HbA2) do not significantly interfere with this assay;   however, presence of multiple variants in a sample may impact the %   interference.       Microbiology Results (last 7 days)     Procedure Component Value Units Date/Time    Blood culture #2 **CANNOT BE ORDERED STAT** [[id number]] Collected:  08/03/17 1307    Order Status:  Sent Specimen:  Blood from Antecubital, Left  Arm Updated:  08/03/17 1803    Blood culture #1 **CANNOT BE ORDERED STAT** [[id number]] Collected:  08/03/17 1308    Order Status:  Sent Specimen:  Blood from Antecubital, Right  Arm Updated:  08/03/17 1803    Urine culture **CANNOT BE ORDERED STAT** [[id number]] Collected:  08/03/17 1311    Order Status:  Sent Specimen:  Urine from Urine,  Catheterized Updated:  08/03/17 5903        Diagnostic Results:  Chest X-Ray: Cardiomegaly and interval worsening of 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 Small pleural effusions are also again noted.    Assessment/Plan:     Active Hospital Problems    Diagnosis  POA    *Acute respiratory failure with hypoxia and hypercarbia [J96.01, J96.02]  Yes    Pneumonia of both lungs due to infectious organism [J18.9]  Yes    Aspiration pneumonia [J69.0]  Admit to ICU.   Supplemental O2 via nasal canula; titrate O2 saturation to >92%.   NPO. Needs swallow evaluation  Continue beta 2 agonist bronchodilator treatments.   Continue IV antibiotics.  Check sputum GS and Cx.   Continue routine medications as before.     Yes    Hypothyroidism [E03.9]  Chronic problem. Will continue chronic medications and monitor for any changes, adjusting as needed.    Yes    Dementia without behavioral disturbance [F03.90]  Dementia is controlled currently. Continue home dementia meds and non-pharmacologic interventions to prevent delirium (No VS between 11PM-5AM, activity during day, opening blinds, providing glasses/hearing aids, and up in chair during daytime). Use PRN anti-psychotics to prevent behavior of self harm during sundowning, and avoid narcotics and benzos unless absolutely necessary. PRN anti-psychotics prescribed to avoid self harm behaviors.    Yes    Essential hypertension [I10]  Chronic problem. Will continue chronic medications and monitor for any changes, adjusting as needed.      Yes    Acute diastolic heart failure [I50.31]  Lasix 40 mg IV now.  Supplemental O2 via nasal canula; titrate O2 saturation to >92%.   Monitor electrolytes for hypokalemia and hypomagnesemia.  Daily weights.  Strict I/Os.  Fluid restriction.  Na restriction <2g/d.    Yes      Code status DNR. Called and could not reach patient's  POA. Patient likely benefits with hospice care and will continue to aspirate.    VTE Risk Mitigation         Ordered     enoxaparin injection 40 mg  Every 24 hours (non-standard times)     Route:  Subcutaneous        08/03/17 2214     Low Risk of VTE  Once      08/03/17 1903        Ara Wilkinson MD  Department of Hospital Medicine   [facility]

## 2017-08-04 NOTE — PROGRESS NOTES
[facility]  Adult Nutrition  Progress Note    SUMMARY     Recommendations  Recommendation/Intervention: 1.) Depending on goals of care, recommend adult regular diet, texture per SLP.   2.) Enteral nutrition if required: recommend Isosource 1.5 @ 10 mls/hr advancing by 10 mls Q4 hrs to goal rate 40 mls/hr continuous providing 1440 kcals/day, 65 g protein/day, 169 g CHO/day, 733 mls water/day. Hold TF x4 hrs for residuals >250mls. Flush 120 mls Q4 hrs or per MD.   Goals: 1.) diet will advance within 96 hrs.   Nutrition Goal Status: new  Communication of RD Recs: reviewed with RN    1. Pneumonia of both lungs due to infectious organism, unspecified part of lung    2. SOB (shortness of breath)    3. Acute on chronic congestive heart failure, unspecified congestive heart failure type      Past Medical History:   Diagnosis Date    Arthritis     Cancer     Coronary artery disease     Dementia     Fractures     lt humerus    Hypertension     Schizophrenia     Thyroid disease        Reason for Assessment  Reason for Assessment: identified at risk by screening criteria  Interdisciplinary Rounds: attended  General Information Comments: Admits with worsening SOB/dementia. NPO. MD discussing comfort care/hospice with family.     Nutrition Prescription Ordered  Current Diet Order: NPO      Evaluation of Received Nutrients/Fluid Intake  Oral Fluid (mL): 0  % Intake of Estimated Energy Needs: 0 - 25 %  % Meal Intake: NPO     Nutrition Risk Screen  Nutrition Risk Screen: dysphagia or difficulty swallowing    Nutrition/Diet History  Food Preferences: no cultural or religioud food preferences identified. NKFA.   Factors Affecting Nutritional Intake: NPO, difficulty/impaired swallowing, impaired cognitive status/motor control    Labs/Tests/Procedures/Meds  Diagnostic Test/Procedure Review: reviewed, pertinent  Pertinent Labs Reviewed: reviewed, pertinent  BMP  Lab Results   Component Value Date      "2017    K 3.4 (L) 2017    CL 92 (L) 2017    CO2 41 (HH) 2017    BUN 17 2017    CREATININE 0.9 2017    CALCIUM 9.3 2017    ANIONGAP 10 2017    ESTGFRAFRICA >60 2017    EGFRNONAA >60 2017     Lab Results   Component Value Date    ALBUMIN 2.2 (L) 2017     Lab Results   Component Value Date    CALCIUM 9.3 2017     No results for input(s): POCTGLUCOSE in the last 24 hours.    Pertinent Medications Reviewed: reviewed   Scheduled Meds:   aspirin  81 mg Oral Daily    bimatoprost  1 drop Both Eyes QHS    ciprofloxacin (CIPRO)400mg/200ml D5W IVPB  400 mg Intravenous Q12H    clonazePAM  1 mg Oral QAM    donepezil  10 mg Oral QHS    enoxaparin  40 mg Subcutaneous Q24H    ferrous sulfate  325 mg Oral BID    Lactobacillus acidoph-L.bulgar  1 tablet Oral TID WM    lactulose  20 g Oral BID    levothyroxine  100 mcg Oral Before breakfast    magnesium oxide  400 mg Oral Daily    metformin  1,000 mg Oral BID WM    metoprolol tartrate  25 mg Oral BID    olanzapine  10 mg Oral BID    oxybutynin  5 mg Oral Daily    pantoprazole  40 mg Intravenous Daily    piperacillin-tazobactam 4.5 g in dextrose 5 % 100 mL IVPB (ready to mix system)  4.5 g Intravenous Q8H    potassium chloride 10%  20 mEq Oral BID    quetiapine  150 mg Oral QHS    vancomycin (VANCOCIN) IVPB  1,250 mg Intravenous Q24H         Physical Findings  Overall Physical Appearance:  (noah, per RN not appropriate for RD visit at this time)  Oral/Mouth Cavity: tooth/teeth missing  Skin: edema (Mane score 14)    Anthropometrics  Temp: 97.5 °F (36.4 °C)  Height: 5' 4"  Weight Method: Stated  Weight: 74.8 kg (164 lb 14.5 oz)  Ideal Body Weight (IBW), Female: 120 lb  % Ideal Body Weight, Female (lb): 137.43 lb  BMI (Calculated): 28.4  BMI Grade: 25 - 29.9 - overweight  Usual Body Weight (UBW), k.8 kg (per chart review, 2017)  % Usual Body Weight: 97.6  % Weight Change From Usual " Weight: 2.4 %       Estimated/Assessed Needs  Weight Used For Calorie Calculations: 74.8 kg (164 lb 14.5 oz)   Energy Need Method: Bristol-St Jeor  RMR (Bristol-St. Jeor Equation): 1223 x1.2= 1467 kcals/day  Weight Used For Protein Calculations: 74.8 kg (164 lb 14.5 oz)  0.8 gm Protein (gm): 59.97 and 1.0 gm Protein (gm): 74.96  Fluid Need Method: RDA Method        Assessment and Plan  Nutrition Problem  inadequate oral intake    Related to (etiology):   Decreased ability to consume sufficient energy    Signs and Symptoms (as evidenced by):   NPO status    Interventions/Recommendations (treatment strategy):  Advance diet per SLP/MD.     Nutrition Diagnosis Status:   New          Monitor and Evaluation  Food and Nutrient Intake: energy intake, food and beverage intake, enteral nutrition intake  Food and Nutrient Adminstration: diet order, enteral and parenteral nutrition administration  Anthropometric Measurements: weight, weight change, body mass index  Biochemical Data, Medical Tests and Procedures: electrolyte and renal panel, glucose/endocrine profile, lipid profile  Nutrition-Focused Physical Findings: overall appearance    Nutrition Risk  Level of Risk:  (x2 weekly)    Nutrition Follow-Up  RD Follow-up?: Yes     Discharge Planning: unable to determine at this time

## 2017-08-04 NOTE — PLAN OF CARE
Pt is a readmit and is a resident at [facility]. During her last visit she ws comfort care and discharged back to [facility] with [facility] hospice to initiate services. The family did not sign hospice consents however signs a waiver stating that the pt could eat. Thus she has returned with aspiration pneumonia. I will continue to follow as needed. DC plan is to return to [facility] .  Daisy Lozano, [facility]     08/03/17 6415   Readmission Questionnaire   At the time of your discharge, did someone talk to you about what your health problems were? Yes   At the time of discharge, did someone talk to you about what to watch out for regarding worsening of your health problem? Yes   At the time of discharge, did someone talk to you about what to do if you experienced worsening of your health problem? Yes   At the time of discharge, did someone talk to you about which medication to take when you left the hospital and which ones to stop taking? Yes   At the time of discharge, did someone talk to you about when and where to follow up with a doctor after you left the hospital? Yes   How often do you need to have someone help you when you read instructions, pamphlets, or other written material from your doctor or pharmacy? Always   Do you have problems taking your medications as prescribed? No   Do you have any problems affording any of  your prescribed medications? No   Do you have problems obtaining/receiving your medications? No   Does the patient have transportation to healthcare appointments? No   Lives With facility resident   Living Arrangements residential facility   Does the patient have family/friends to help with healtcare needs after discharge? no   Does your caregiver provide all the help you need? Yes   Are you currently feeling confused? No   Are you currently having problems thinking? No   Are you currently having memory problems? No   Have you felt down, depressed, or hopeless? 0   Have  you felt little interest or pleasure in doing things? 0   In the last 7 days, my sleep quality was: good

## 2017-08-04 NOTE — PLAN OF CARE
Problem: Fall Risk (Adult)  Goal: Absence of Falls  Patient will demonstrate the desired outcomes by discharge/transition of care.   Outcome: Ongoing (interventions implemented as appropriate)  Safety maintained    Comments: Rested well, vital signs stable, PEG tube patent, clamped at present

## 2017-08-04 NOTE — PLAN OF CARE
Problem: Patient Care Overview  Goal: Plan of Care Review  Outcome: Ongoing (interventions implemented as appropriate)  Complaints of dyspnea and SOB throughout the day and requesting food and drink. Urinating around Chavez when coughing and chavez not collecting much urine. Chavez removed. Groins and perianal area excoriated, moisture barrier applied with each cleaning for urinary incontinence. POA unclear with decisions regarding  care of patient. Safety maintained.

## 2017-08-04 NOTE — PROGRESS NOTES
Morning meds held for NPO status of patient c/o dyspnea and SOB, currently with NRB at 15L to maintaine sat greater than 92%.

## 2017-08-04 NOTE — PLAN OF CARE
Problem: Fall Risk (Adult)  Goal: Absence of Falls  Patient will demonstrate the desired outcomes by discharge/transition of care.   Outcome: Ongoing (interventions implemented as appropriate)  Safety maintained    Comments: Restless this shift , requesting water throughout the shift , even after being reminded if NPO status. Bath completed with linen change, changed to high flow O2 to increase comfortable breathing

## 2017-08-04 NOTE — PROGRESS NOTES
"Patient's sister Effie, SHAHANA and Effie' daughter at bedside and questions answered concerning nutrition and aspirating. Patient's niece has told me that she is against morphine for comfort for patient and states "this hospital gave my step father morphine and he just , we are against morphine, we don't like it". Niece argumentative concerning feeding patient and stating that "it's cruel to not let her eat, even prisoners get their last meal". Attempted to explain again that patient is aspirating when eating and drinking and that patient reports she is having difficulty breathing and feels short of breath. Patient's commented to her daughter in my presence that maybe patient "needs Ativan or something" and her daughter told her no, Effie then stated "she's restless" and daughter had no reply.   "

## 2017-08-04 NOTE — PROGRESS NOTES
08/04/17 0744   Patient Assessment/Suction   Level of Consciousness (AVPU) alert   All Lung Fields Breath Sounds crackles;diminished   Cough Frequency infrequent   Cough Type good;productive   Suction Method oral  (pt fighting oral sx)   Sputum Amount small   Sputum Color white-yellow   Sputum Consistency thin   PRE-TX-O2-ETCO2   O2 Device (Oxygen Therapy) High Flow nasal Cannula   $ Is the patient on Oxygen? Yes   Flow (L/min) 10  (increased to 14L)   SpO2 (!) 85 %  (O2 sats increased to 87 on 14L)   Pulse Oximetry Type Continuous   $ Pulse Oximetry - Multiple Charge Pulse Oximetry - Multiple   Pulse 88   Resp (!) 29     Unable to NT sx due to pt fighting. Was able to oral sx pt briefly due to pt fighting this as well. Placed pt on NRB due to O2 sats. Sats improved with NRB.

## 2017-08-04 NOTE — PLAN OF CARE
Dr Wilkinson spoke with the pt's [facility], [facility] to discuss prognosis and plan of care.  Dr Wilkinson explained in great detail the benefits of comfort care and hospice. He answered all her questions....OLIVIA Harper CM

## 2017-08-05 NOTE — PROGRESS NOTES
08/05/17 0655   Patient Assessment/Suction   Level of Consciousness (AVPU) alert   HARRISON Breath Sounds coarse;diminished   PRE-TX-O2-ETCO2   O2 Device (Oxygen Therapy) Non Rebreather Mask   $ Is the patient on Oxygen? Yes   SpO2 (!) 89 %   Pulse Oximetry Type Continuous   $ Pulse Oximetry - Multiple Charge Pulse Oximetry - Multiple   Pulse 91   Resp (!) 27   Aerosol Therapy   $ Aerosol Therapy Charges PRN treatment not required     Pt pulling NRB off. O2 sats drop immediately. Pt asking for breakfast. NRB placed back on pt.

## 2017-08-05 NOTE — PLAN OF CARE
08/04/17 2100   Patient Assessment/Suction   Level of Consciousness (AVPU) alert   All Lung Fields Breath Sounds coarse;diminished   PRE-TX-O2-ETCO2   O2 Device (Oxygen Therapy) Non Rebreather Mask   SpO2 96 %   Pulse 85   Resp (!) 32   Pt tolerates NRB well.

## 2017-08-05 NOTE — PROGRESS NOTES
Called pt SHAHANA Chou who states she can come in at 1pm for a meeting with Dr. Vazquez; Dr. Vazquez requested that family come to to discuss plan of care.

## 2017-08-05 NOTE — PROGRESS NOTES
"Progress Note  [facility] Medicine    Admit Date: 8/3/2017    SUBJECTIVE:     Follow-up For:  Acute respiratory failure with hypoxia and hypercarbia      Interval history (See H&P for complete P,F,SHx) : Patient remains very hypoxic. Attempted family meeting today, but family left before I could meet them.     Review of Systems: Unable to determine d/t confusion by patient.    OBJECTIVE:     Vital Signs Range (Last 24H):  Temp:  [97.6 °F (36.4 °C)-99.3 °F (37.4 °C)]   Pulse:  []   Resp:  [19-38]   BP: (127-174)/(58-89)   SpO2:  [87 %-100 %]     I & O (Last 24H):    Intake/Output Summary (Last 24 hours) at 08/06/17 0032  Last data filed at 08/05/17 2338   Gross per 24 hour   Intake             1200 ml   Output                0 ml   Net             1200 ml       Estimated body mass index is 24.94 kg/m² as calculated from the following:    Height as of this encounter: 5' 4" (1.626 m).    Weight as of this encounter: 65.9 kg (145 lb 4.5 oz).    Physical Exam:  General- Patient pleasantly confused in NAD  HEENT- PERRLA, EOMI, OP clear, MMM  Neck- No JVD, Lymphadenopathy, Thyromegaly  CV- Regular rate and rhythm, No Murmur/hiral/rubs  Resp- Patient with increased respiratory rate and effort desats in the 60s off of her face mask.  Noted bilateral diffuse wheezing as well as tachycardia with positive EHSAN.  GI- Non tender/non-distended, BS normoactive x4 quads, no HSM  Extrem- No cyanosis, clubbing, edema. Pulses 2+ and symmetric  Neuro- Strength 5/5 flexors/extensors, DTRs 2+ and symmetric, Intact sensation to light touch grossly    Laboratory/Diagnostic Data:  Reviewed and noted in plan where applicable- Please see chart for full lab data.    Medications:  Medication list was reviewed and changes noted under Assessment/Plan.    ASSESSMENT/PLAN:     Active Problems:    Active Hospital Problems    Diagnosis  POA    *Acute respiratory failure with hypoxia and hypercarbia [J96.01, J96.02]- We will need to discuss with " family regarding further continuity and goals of care.  Patient clearly cannot have both a DNR and/or comfort measures and be able to eat if she is clearly aspirating and the source of her demise.  I will discuss with family further, however for now we will continue the patient on full measures for her respiratory failure- including IV antibiotics, IV steroids and scheduled nebulizations as well as oxygen therapy. Strict NPO!  Will discuss with the family regarding PEG tube feeding versus comfort feeding versus hospice care.  Yes    Pneumonia of both lungs due to infectious organism [J18.9]-  Aspiration PNA- see above.  Yes    Aspiration pneumonia [J69.0]-  See above.  Yes    Dementia without behavioral disturbance [F03.90]- Dementia is controlled currently. Continue home dementia meds and non-pharmacologic interventions to prevent delirium (No VS between 11PM-5AM, activity during day, opening blinds, providing glasses/hearing aids, and up in chair during daytime). Use PRN anti-psychotics to prevent behavior of self harm during sundowning, and avoid narcotics and benzos unless absolutely necessary. PRN anti-psychotics prescribed to avoid self harm behaviors.  Yes    Essential hypertension [I10]- Chronic, controlled.  Continue home regimen monitoring BP closely.  Will titrate BP medications as needed for sustained BP control.  Yes      Resolved Hospital Problems    Diagnosis Date Resolved POA   No resolved problems to display.       Disposition- SNF/hospice    VTE Risk Mitigation         Ordered     enoxaparin injection 40 mg  Every 24 hours (non-standard times)     Route:  Subcutaneous        08/03/17 6062        .

## 2017-08-05 NOTE — PROGRESS NOTES
Pt SHAHANA Chou calls to say she cannot come at 1pm but she will be here at 4pm when her daughter can bring her.
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
## 2017-08-05 NOTE — CONSULTS
Pham Weinberg [id number] is a 77 y.o. female who has been consulted for vancomycin dosing.    The patient has the following labs:     Date Creatinine (mg/dl)    BUN WBC Count   8/5/2017 Estimated Creatinine Clearance: 49.7 mL/min (based on Cr of 0.9). Lab Results   Component Value Date    BUN 15 08/05/2017     Lab Results   Component Value Date    WBC 9.20 08/05/2017        Current weight is 65.9 kg (145 lb 4.5 oz)    Pt is receiving vancomycin 1250 mg every 12 hours.  Vancomycin trough from 08/05/2007 at 1348 was 10 mg/dL.  Target trough range is 15-20 mg/dL.   Trough was drawn on time and anticipate it is subtherapeutic. Pharmacy will increase current dose.   The patient will be changed to a vancomycin dose of 1500 mg every 24 hours. A vancomycin trough has been ordered prior to 3rd dose 08/07/2017 due  at 1530.      Patient will be followed by pharmacy for changes in renal function, toxicity, and efficacy.  Thank you for allowing us to participate in this patient's care.     Zakiya Higuera 7

## 2017-08-05 NOTE — PLAN OF CARE
Problem: Patient Care Overview  Goal: Plan of Care Review  Wet croupy sounding cough noted. Not able to receive any medication by mouth to due high risk of aspiration. Confused and needing to be reassured frequently. Constant reminding to keep oxygen and pulse ox on. Desats to 70's on room air.

## 2017-08-06 NOTE — PROGRESS NOTES
"Pt sister Ashlee and her son remain at the BS.  Ashlee's son states that he is going to talk with Estephania, when she and Effie arrive, about allowing pt to put on hospice.  He has called them at least twice and relayed to me that they are coming but they have not yet arrived.      Then JO ANN Archuleta RN and lets me know that Effie called him to let him know they do not want to decide about the PICC until they see Dr. Wilkinson.  She then asks if he will hold on and Estephania comes on the line; Estephania reiterates to Van BAKER "We do not want Dr. Vazquez," and continues on with a number of complaints. Although this statement has not come from Effie the POA, Van BAKER lets Estephania know again that Dr. Vazquez is aware and they will be able to meet with Dr. Wilkinson tomorrow.   "

## 2017-08-06 NOTE — PROGRESS NOTES
"Pt has a 22 gauge IV in good condition and is receiving multiple antibiotics through it.  Dr. Vazquez and Van Archuleta RN had discussed the need for IV nutrition this morning and Dr. Vazquez placed an order for a PICC line which he and I held off as pt had a working IV and family was scheduled to speak with Dr. Vazquez about long term care plan. This morning Timmy BAKER called Effie, pt sister and POA, to obtain consent for the PICC.  After explaining in detail the procedure, risks, and benefits he stated, "Please let us know."  He said that Effie said she would need to talk with her family before making a decision.      Effie and the pt's sister Ashlee arrived with several family members a couple hours later.  As they were visiting with the pt the subject of nutrition came up.  I explained that we have an order for a PICC line but that Effie said she needed to talk with her family before giving consent.  I asked if she had contacted Ashlee; she said No but that they could call Effie and find out.  One of the family called Effie and then relayed to me that she said her daughter Estephania would be bringing her to the hospital after lunch.  Ashlee stated "Effie can't make decisions without Estephania.  She says if she says anything Estephania will not take her places and Effie doesn't have a car so she depends on Estephania."  Ashlee stated "Effie can't speak without Estephania interrupting her." I related to Ashlee that this was also my experience yesterday when attempting to speak with Effie.      Will speak with Effie about the PICC line when she arrives to visit.    "

## 2017-08-06 NOTE — PLAN OF CARE
Problem: Patient Care Overview  Goal: Plan of Care Review  Outcome: Ongoing (interventions implemented as appropriate)  Pt alert oriented to self and knows is in hospital. Follows simple commands. Forgetful. Asking for water, food frequently. Has non productive cough. Remains on high flow nasal cannula to maintain sat greater than 92. Incontinent of urine. Barrier cream to excoriated rash perineum. pts sister and niece in at visiting hours. Would like to wait till Monday to speak with Dr Wilkinson whom they are familiar with. Reinforced why pt was NPO. Questions answered.

## 2017-08-06 NOTE — PROGRESS NOTES
08/06/17 0730   Patient Assessment/Suction   Level of Consciousness (AVPU) alert   All Lung Fields Breath Sounds clear;diminished   Cough Type none   PRE-TX-O2-ETCO2   O2 Device (Oxygen Therapy) High Flow nasal Cannula   $ Is the patient on Oxygen? Yes   Flow (L/min) 15   SpO2 95 %   Pulse Oximetry Type Continuous   Pulse 78   Resp 16   Aerosol Therapy   $ Aerosol Therapy Charges Aerosol Treatment   Respiratory Treatment Status given   SVN/Inhaler Treatment Route mask   Position During Treatment HOB at 30 degrees   Patient Tolerance good   Post-Treatment   Post-treatment Heart Rate (beats/min) 76   Post-treatment Resp Rate (breaths/min) 17   All Fields Breath Sounds unchanged

## 2017-08-06 NOTE — PLAN OF CARE
08/05/17 1935   Patient Assessment/Suction   Level of Consciousness (AVPU) alert   Respiratory Effort Unlabored   Expansion/Accessory Muscles/Retractions no use of accessory muscles   All Lung Fields Breath Sounds coarse;diminished   Rhythm/Pattern, Respiratory tachypnea   PRE-TX-O2-ETCO2   O2 Device (Oxygen Therapy) High Flow nasal Cannula   Flow (L/min) 15   SpO2 (!) 90 %   Pulse 82   Resp (!) 24   Aerosol Therapy   $ Aerosol Therapy Charges Aerosol Treatment   Respiratory Treatment Status given   SVN/Inhaler Treatment Route mask;with oxygen   Position During Treatment HOB at 30 degrees   Patient Tolerance good   Post-Treatment   Post-treatment Heart Rate (beats/min) 80   Post-treatment Resp Rate (breaths/min) 24   All Fields Breath Sounds unchanged   Pt tolerates HFNC and treatments well.

## 2017-08-07 NOTE — PLAN OF CARE
08/06/17 1953   Patient Assessment/Suction   All Lung Fields Breath Sounds diminished   Rhythm/Pattern, Respiratory pattern irregular;labored   PRE-TX-O2-ETCO2   O2 Device (Oxygen Therapy) Non Rebreather Mask   $ Is the patient on Oxygen? Yes   Flow (L/min) 15   SpO2 (!) 80 %   Pulse Oximetry Type Continuous   $ Pulse Oximetry - Multiple Charge Pulse Oximetry - Multiple   Pulse 98   Resp (!) 35   Aerosol Therapy   $ Aerosol Therapy Charges Aerosol Treatment   Respiratory Treatment Status given   SVN/Inhaler Treatment Route mask   Position During Treatment HOB at 45 degrees   Patient Tolerance good   Post-Treatment   Post-treatment Heart Rate (beats/min) 96   Post-treatment Resp Rate (breaths/min) 35   All Fields Breath Sounds aeration increased

## 2017-08-07 NOTE — NURSING
Pt transferred from ICU on comfort measures at ~0000. Pt given PRN morphine, PRN ativan as needed. Answered family's questions as needed, comfort and listening provided to both the sister and niece. Explained the benefits of morphine for the patient. Pt's O2 sats slowly decreasing throughout the shift. Vital signs monitored frequently, will continue to monitor.

## 2017-08-07 NOTE — PROGRESS NOTES
I sent a referral to [facility] to consult for inpatient hospice care via [facility] and I manually faxed it to [phone].     I updated Teresa at [phone], she will be here shortly with a nurse to evaluate the patient to see if she meets inpatient criteria.  Daisy Lozano LMSW

## 2017-08-07 NOTE — PROGRESS NOTES
"Progress Note  [facility] Medicine    Admit Date: 8/3/2017    SUBJECTIVE:     Follow-up For:  Acute respiratory failure with hypoxia and hypercarbia      Interval history (See H&P for complete P,F,SHx) : Patient remains very hypoxic. Attempted to call family again this AM, to no avail. Patient started becoming worse today, and desaturations worsened. Patient's POA, Effie, was called by ICU nurse and she requested comfort measures and a call from myself. I called her, and we spoke on the phone for a while defining scope of care for DNR, as well as invasive support such as central lines, intubation, and PEG tube feeding. Effie stated that her sister would not want any of these, and that she is "ready to die". I confirmed comfort measures again and the POA agreed that's what she would want.    Review of Systems: Unable to determine d/t confusion by patient.    OBJECTIVE:     Vital Signs Range (Last 24H):  Temp:  [97.8 °F (36.6 °C)-98.9 °F (37.2 °C)]   Pulse:  [77-98]   Resp:  [16-42]   BP: ()/(59-79)   SpO2:  [80 %-96 %]     I & O (Last 24H):    Intake/Output Summary (Last 24 hours) at 08/06/17 2026  Last data filed at 08/06/17 0501   Gross per 24 hour   Intake              550 ml   Output                0 ml   Net              550 ml       Estimated body mass index is 24.94 kg/m² as calculated from the following:    Height as of this encounter: 5' 4" (1.626 m).    Weight as of this encounter: 65.9 kg (145 lb 4.5 oz).    Physical Exam:  General- Patient pleasantly confused in NAD  HEENT- PERRLA, EOMI, OP clear, MMM  Neck- No JVD, Lymphadenopathy, Thyromegaly  CV- Regular rate and rhythm, No Murmur/hiral/rubs  Resp- Patient with increased respiratory rate and effort desats in the 60s off of her face mask.  Noted bilateral diffuse wheezing as well as tachycardia with positive EHSAN.  GI- Non tender/non-distended, BS normoactive x4 quads, no HSM  Extrem- No cyanosis, clubbing, edema. Pulses 2+ and symmetric  Neuro- " Strength 5/5 flexors/extensors, DTRs 2+ and symmetric, Intact sensation to light touch grossly    Laboratory/Diagnostic Data:  Reviewed and noted in plan where applicable- Please see chart for full lab data.    Medications:  Medication list was reviewed and changes noted under Assessment/Plan.    ASSESSMENT/PLAN:     Active Problems:    Active Hospital Problems    Diagnosis  POA    *Acute respiratory failure with hypoxia and hypercarbia [J96.01, J96.02]- Patient is decompensating and will not survive unless aggressive and invasive measures are taken. Per her POA, Ms. Weinberg would not want such measures. As such, with the knowledge that she is actively dying, we will provide her supportive palliative care to ease her work of breathing and her suffering. Start morphine IV q2 PRN and ativan PRN for pain/resp distress and anxiety. Discussed with Kimberly, ICU nurse and POA at length.  Yes    Pneumonia of both lungs due to infectious organism [J18.9]-  Aspiration PNA- see above.  Yes    Aspiration pneumonia [J69.0]-  See above.  Yes    Dementia without behavioral disturbance [F03.90]- Dementia is controlled currently. Continue home dementia meds and non-pharmacologic interventions to prevent delirium (No VS between 11PM-5AM, activity during day, opening blinds, providing glasses/hearing aids, and up in chair during daytime). Use PRN anti-psychotics to prevent behavior of self harm during sundowning, and avoid narcotics and benzos unless absolutely necessary. PRN anti-psychotics prescribed to avoid self harm behaviors.  Yes    Essential hypertension [I10]- Chronic, controlled.   Yes      Resolved Hospital Problems    Diagnosis Date Resolved POA   No resolved problems to display.       Disposition- Inpatient hospice    VTE Risk Mitigation         Ordered     enoxaparin injection 40 mg  Every 24 hours (non-standard times)     Route:  Subcutaneous        08/03/17 9403        .

## 2017-08-07 NOTE — PROGRESS NOTES
I spoke to Teresa with  and she stated that she is going to have the nurse call OLIVIA Cam case manager regarding the pts inpatient hospice criteria. They stated that she is sleeping now and need to see if she requires medication over night. They stated that the back of plan will be to dc to [facility] with [facility]. I explained that during the last admit the family did not sign hospice consents but signed a waiver for the pt to eat which brought her back here due to aspiration. She stated that she does not think that the pt will survive 5 days and she is going to speak to Dorina and reassess tomorrow. Daisy Lozano LMSW
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
## 2017-08-07 NOTE — PROGRESS NOTES
Report given to MS3 nurse Mya. Patient transferred to [facility], oxygen saturation remains low, but patient is not exhibiting signs of distress. Family at bedside before transfer.

## 2017-08-07 NOTE — PROGRESS NOTES
"Notified Ms. Chou (pts MPOA) of decline in patient's respiratory status. Thoroughly explained comfort measures. Ms. Chou requested that her sister be placed on comfort measures; stating "I don't want her to suffer."     Notified Dr. Vazquez of respiratory status and conversation with Ms. Chou. Supplied Dr. Vazquez with Effie' phone number so they could discuss the next course of action.   "

## 2017-08-07 NOTE — PLAN OF CARE
Problem: Patient Care Overview  Goal: Plan of Care Review  Outcome: Ongoing (interventions implemented as appropriate)  0700  Patient on COMFORT Care at this time .  Patient's family asked for tx not to be given at this time.  Hr 102 and 02 saturation 70% on high flow nc.

## 2017-08-07 NOTE — PROGRESS NOTES
Progress Note  Hospital Medicine  Patient Name:Pham Weinberg  MRN:  [id number]  Patient Class: IP- Inpatient  Admit Date: 8/3/2017  Length of Stay: 4 days  Expected Discharge Date:   Attending Physician: Ara Wilkinson MD  Primary Care Provider:  Freddie Johnson MD    SUBJECTIVE:     Principal Problem: Acute respiratory failure with hypoxia and hypercarbia  Initial history of present illness: Patient is a 77 y.o. female admitted to Hospitalist Service from [facility] in ICU with complaint of worsening SOB. Patient reportedly has past medical history significant for dementia, Schizophrenia, hypertension, hypothyroidism, CAD and unspecified cancer history. Patient was recently hospitalized from 07-26-17 to 08-01-17 related to aspiration pneumonia + CHF exacerbation.  Patient was discharged to nursing home with hospice. Reportedly, patient signed a waiver at NH to feed the patient and upon worsening is oxygen sats, revoked hospice but continuing with DNR. Patient is unable to provide history. On EMS arrival, the pt was in respiratory distress and oxygen saturation was 94%. She was given albuterol breathing treatment and solumedrol en route.     PMH/PSH/SH/FH/Meds: reviewed.    Symptoms/Review of Systems: Patient is on comfort care, unresponsive. Appears comfortable.   Diet:  NPO  Activity level: Bed rest  Pain: Unresponsive       OBJECTIVE:   Vital Signs (Most Recent):      Temp: 98 °F (36.7 °C) (08/06/17 2300)  Pulse: 102 (08/07/17 0523)  Resp: (!) 36 (08/07/17 0547)  BP: 139/61 (08/06/17 2334)  SpO2: (!) 77 % (08/07/17 0547)       Vital Signs Range (Last 24H):  Temp:  [98 °F (36.7 °C)-98.9 °F (37.2 °C)]   Pulse:  []   Resp:  [17-57]   BP: (139-164)/(61-73)   SpO2:  [44 %-93 %]     Weight: 65.9 kg (145 lb 4.5 oz)  Body mass index is 24.94 kg/m².    Intake/Output Summary (Last 24 hours) at 08/07/17 0928  Last data filed at 08/06/17 2200   Gross per 24 hour   Intake              450 ml    Output                0 ml   Net              450 ml     Physical Examination:  General appearance: well developed, appears stated age, unresponsive  Head: normocephalic, atraumatic  Eyes:  conjunctivae/corneas clear. PERRL.  Nose: Nares normal. Septum midline.  Throat: lips, mucosa, and tongue normal; teeth and gums normal, no throat erythema.  Neck: supple, symmetrical, trachea midline, no JVD and thyroid not enlarged, symmetric, no tenderness/mass/nodules  Lungs:  Scattered rhonchi B/L  Chest wall: no tenderness  Heart: regular rate and rhythm, S1, S2 normal, no murmur, click, rub or gallop  Abdomen: soft, non-tender non-distented; bowel sounds normal; no masses,  no organomegaly  Extremities: no cyanosis, clubbing or edema.   Pulses: 2+ and symmetric  Skin: Skin color, texture, turgor normal. No rashes or lesions.  Lymph nodes: Cervical, supraclavicular, and axillary nodes normal.  Neurologic: Unresponsive    CBC:    Recent Labs  Lab 08/05/17  0310 08/06/17  0347 08/07/17  0450   WBC 9.20 7.70 22.80*   RBC 3.85* 4.06 4.20   HGB 11.1* 11.6* 12.3   HCT 34.8* 36.5* 38.3    290 445*   MCV 91 90 91   MCH 28.8 28.7 29.2   MCHC 31.8* 31.9* 32.1   BMP    Recent Labs  Lab 08/05/17 0310 08/06/17  0347 08/07/17  0450   * 176* 125*    139 142   K 3.5 3.6 3.2*   CL 92* 90* 92*   CO2 39* 40* 40*   BUN 15 17 23   CREATININE 0.9 1.0 1.1   CALCIUM 9.0 9.0 9.3      Diagnostic Results:  Microbiology Results (last 7 days)     Procedure Component Value Units Date/Time    Blood culture #2 **CANNOT BE ORDERED STAT** [[id number]] Collected:  08/03/17 1307    Order Status:  Completed Specimen:  Blood from Antecubital, Left  Arm Updated:  08/06/17 2212     Blood Culture, Routine No Growth to date     Blood Culture, Routine No Growth to date     Blood Culture, Routine No Growth to date     Blood Culture, Routine No Growth to date    Blood culture #1 **CANNOT BE ORDERED STAT** [[id number]] Collected:  08/03/17 1300     Order Status:  Completed Specimen:  Blood from Antecubital, Right  Arm Updated:  08/06/17 2212     Blood Culture, Routine No Growth to date     Blood Culture, Routine No Growth to date     Blood Culture, Routine No Growth to date     Blood Culture, Routine No Growth to date    Urine culture **CANNOT BE ORDERED STAT** [[id number]]  (Susceptibility) Collected:  08/03/17 1311    Order Status:  Completed Specimen:  Urine from Urine, Catheterized Updated:  08/06/17 1249     Urine Culture, Routine --     ESCHERICHIA COLI ESBL  >100,000 cfu/ml           CXR: new opacification of the left hemithorax may be secondary to left lung atelectasis.  There is shift of the aorta to the left.  A pleural effusion is not ruled out.  Severe infiltrate remains throughout the right lung.    CXR: Continued opacification left hemithorax secondary to atelectasis and probable pleural effusion.  Severe infiltrate remains in the right lung  Assessment/Plan:      *Acute respiratory failure with hypoxia and hypercarbia [J96.01, J96.02]   Yes    Pneumonia of both lungs due to infectious organism [J18.9]   Yes    Aspiration pneumonia [J69.0]       Yes    Hypothyroidism [E03.9]       Yes    Dementia without behavioral disturbance [F03.90]       Yes    Essential hypertension [I10]           Yes          Patient is on comfort care. Discussed with patient's two sisters and brother in law. Patient comfortable with comfort care. I discussed with family members at length and answered their questions. I offered them hospice care in patient setting at the hospital. They are willing to talk and ask questions to the hospice nurse. Will abide by their wishes. Time spent in care of the patient, counseling and coordination of care (Greater than 50% spent in direct face to face contact): 35 min.    VTE Risk Mitigation     None        Ara Wilkinson MD  Department of Hospital Medicine   [facility]

## 2017-08-07 NOTE — CONSULTS
Pham Weinberg [id number] is a 77 y.o. female who had been consulted for vancomycin dosing.    Vancomycin has been discontinued.  Pharmacy consult for vancomycin dosing in no longer required.      Thank you for allowing us to participate in this patient's care.     Chris Cornell

## 2017-08-08 NOTE — PLAN OF CARE
I called the pt's sister, Effie ([phone]) to discuss the plan of care since the pt remains inpt with comfort care.   I briefly spoke with Effie and then the pt's niece got on the phone screaming at me that they were told by the nurse Mya that the pt would remain in the hospital until she . She also says that a black lady from case management came and told them the pt could remain in the hospital as long as she needed under her Medicaid days.   I asked her if they had met with [facility], she continues to scream at me that hospice was very inappropriate with them and said the pt would be allowed to stay in the hospital for 5 days then would be put on the street to die. She also stated during our conversation that hospice had told them the pt would be moved via ambulance to their Inpt facility which they did not want.   I attempted to explain that the pt could not remain in the hospital under comfort care that they needed to make a decision regarding her discharge plan. The niece then begins to start crying saying she wishes the pt would just die so all this would be over.   She states I need to figure out why they are being told different information from lots of people and all she knows is that the pt is staying here until she dies.   I will follow up with Margarette Cloud CM director for further recommendations.....OLIVIA Harper CM

## 2017-08-08 NOTE — PLAN OF CARE
Problem: Patient Care Overview  Goal: Individualization & Mutuality  Pt vital signs stable at this time. IV RIGHT FA at KVO. Pt is unresponsive. Comfort measures ordered. O2 15L NC in use. Door open, bed alarm in use. Q2hr frequent checks performed, will continue to monitor.

## 2017-08-08 NOTE — PROGRESS NOTES
Pt is unresponsive, family at bedside, hygiene performed and linens changed. PIV maintained, O2 by nc throughout shift. Pt has remained free of injuries and falls throughout shift.  Environment free of clutter, side rails up x2, and call light within reach.  Family to remain at bedside throughout out night.

## 2017-08-08 NOTE — PLAN OF CARE
08/07/17 2125   Patient Assessment/Suction   Level of Consciousness (AVPU) unresponsive   PRE-TX-O2-ETCO2   O2 Device (Oxygen Therapy) High Flow nasal Cannula   Flow (L/min) 15   SpO2 (!) 86 %   Aerosol Therapy   $ Aerosol Therapy Charges PRN treatment not required  (pt family stated they will request neb treatments)

## 2017-08-08 NOTE — PLAN OF CARE
Problem: Patient Care Overview  Goal: Plan of Care Review  Pt vital signs stable at this time. Rt FA IV @ KVO. Pt is unresponsive, family at bedside early in shift. Comfort measures ordered. O2 15L NC in use. Door open, bed alarm in use. Q2hr frequent checks performed, will continue to monitor.

## 2017-08-08 NOTE — PROGRESS NOTES
Progress Note  Hospital Medicine  Patient Name:Pham Weinberg  MRN:  [id number]  Patient Class: IP- Inpatient  Admit Date: 8/3/2017  Length of Stay: 5 days  Expected Discharge Date:   Attending Physician: Ara Wilkinson MD  Primary Care Provider:  Freddie Johnson MD    SUBJECTIVE:     Principal Problem: Acute respiratory failure with hypoxia and hypercarbia  Initial history of present illness: Patient is a 77 y.o. female admitted to Hospitalist Service from [facility] in ICU with complaint of worsening SOB. Patient reportedly has past medical history significant for dementia, Schizophrenia, hypertension, hypothyroidism, CAD and unspecified cancer history. Patient was recently hospitalized from 07-26-17 to 08-01-17 related to aspiration pneumonia + CHF exacerbation.  Patient was discharged to nursing home with hospice. Reportedly, patient signed a waiver at NH to feed the patient and upon worsening is oxygen sats, revoked hospice but continuing with DNR. Patient is unable to provide history. On EMS arrival, the pt was in respiratory distress and oxygen saturation was 94%. She was given albuterol breathing treatment and solumedrol en route.     PMH/PSH/SH/FH/Meds: reviewed.    Symptoms/Review of Systems: Patient is on comfort care, unresponsive. Appears comfortable.   Diet:  NPO  Activity level: Bed rest  Pain: Unresponsive       OBJECTIVE:   Vital Signs (Most Recent):      Temp: 97.6 °F (36.4 °C) (08/08/17 0734)  Pulse: 101 (08/08/17 0734)  Resp: (!) 25 (08/08/17 0734)  BP: (!) 146/69 (08/08/17 0734)  SpO2: (!) 81 % (08/08/17 0734)       Vital Signs Range (Last 24H):  Temp:  [97.6 °F (36.4 °C)-97.8 °F (36.6 °C)]   Pulse:  []   Resp:  [18-25]   BP: (133-146)/(69-70)   SpO2:  [81 %-99 %]     Weight: 65.9 kg (145 lb 4.5 oz)  Body mass index is 24.94 kg/m².  No intake or output data in the 24 hours ending 08/08/17 0916  Physical Examination:  General appearance: well developed, appears  stated age, unresponsive  Head: normocephalic, atraumatic  Eyes:  conjunctivae/corneas clear. PERRL.  Nose: Nares normal. Septum midline.  Throat: lips, mucosa, and tongue normal; teeth and gums normal, no throat erythema.  Neck: supple, symmetrical, trachea midline, no JVD and thyroid not enlarged, symmetric, no tenderness/mass/nodules  Lungs:  Scattered rhonchi B/L  Chest wall: no tenderness  Heart: regular rate and rhythm, S1, S2 normal, no murmur, click, rub or gallop  Abdomen: soft, non-tender non-distented; bowel sounds normal; no masses,  no organomegaly  Extremities: no cyanosis, clubbing or edema.   Pulses: 2+ and symmetric  Skin: Skin color, texture, turgor normal. No rashes or lesions.  Lymph nodes: Cervical, supraclavicular, and axillary nodes normal.  Neurologic: Unresponsive    CBC:    Recent Labs  Lab 08/05/17 0310 08/06/17 0347 08/07/17  0450   WBC 9.20 7.70 22.80*   RBC 3.85* 4.06 4.20   HGB 11.1* 11.6* 12.3   HCT 34.8* 36.5* 38.3    290 445*   MCV 91 90 91   MCH 28.8 28.7 29.2   MCHC 31.8* 31.9* 32.1   BMP    Recent Labs  Lab 08/05/17 0310 08/06/17 0347 08/07/17  0450   * 176* 125*    139 142   K 3.5 3.6 3.2*   CL 92* 90* 92*   CO2 39* 40* 40*   BUN 15 17 23   CREATININE 0.9 1.0 1.1   CALCIUM 9.0 9.0 9.3      Diagnostic Results:  Microbiology Results (last 7 days)     Procedure Component Value Units Date/Time    Blood culture #1 **CANNOT BE ORDERED STAT** [[id number]] Collected:  08/03/17 1308    Order Status:  Completed Specimen:  Blood from Antecubital, Right  Arm Updated:  08/07/17 2212     Blood Culture, Routine No Growth to date     Blood Culture, Routine No Growth to date     Blood Culture, Routine No Growth to date     Blood Culture, Routine No Growth to date     Blood Culture, Routine No Growth to date    Blood culture #2 **CANNOT BE ORDERED STAT** [[id number]] Collected:  08/03/17 1307    Order Status:  Completed Specimen:  Blood from Antecubital, Left  Arm Updated:   08/07/17 2212     Blood Culture, Routine No Growth to date     Blood Culture, Routine No Growth to date     Blood Culture, Routine No Growth to date     Blood Culture, Routine No Growth to date     Blood Culture, Routine No Growth to date    Urine culture **CANNOT BE ORDERED STAT** [[id number]]  (Susceptibility) Collected:  08/03/17 1311    Order Status:  Completed Specimen:  Urine from Urine, Catheterized Updated:  08/07/17 1115     Urine Culture, Routine --     ESCHERICHIA COLI ESBL  >100,000 cfu/ml           CXR: new opacification of the left hemithorax may be secondary to left lung atelectasis.  There is shift of the aorta to the left.  A pleural effusion is not ruled out.  Severe infiltrate remains throughout the right lung.    CXR: Continued opacification left hemithorax secondary to atelectasis and probable pleural effusion.  Severe infiltrate remains in the right lung  Assessment/Plan:      *Acute respiratory failure with hypoxia and hypercarbia [J96.01, J96.02]   Yes    Pneumonia of both lungs due to infectious organism [J18.9]   Yes    Aspiration pneumonia [J69.0]       Yes    Hypothyroidism [E03.9]       Yes    Dementia without behavioral disturbance [F03.90]       Yes    Essential hypertension [I10]           Yes          Patient is on comfort care. No family at bedside. Reportedly refused in patient hospice yesterday. Will have to talk with family on plan of care.     VTE Risk Mitigation     None        Ara Wilkinson MD  Department of Hospital Medicine   [facility]

## 2017-08-08 NOTE — PROGRESS NOTES
[facility]  Adult Nutrition  Progress Note    SUMMARY     8/8/17 Per report from medical rounds and chart, pt is unresponsive. Family desires comfort care only.  Not appropriate for follow up.     Recommendations  Recommendation/Intervention: 1.) Depending on goals of care, recommend adult regular diet, texture per SLP.   2.) Enteral nutrition if required: recommend Isosource 1.5 @ 10 mls/hr advancing by 10 mls Q4 hrs to goal rate 40 mls/hr continuous providing 1440 kcals/day, 65 g protein/day, 169 g CHO/day, 733 mls water/day. Hold TF x4 hrs for residuals >250mls. Flush 120 mls Q4 hrs or per MD.   Goals: 1.) diet will advance within 96 hrs.   Nutrition Goal Status: continues  Communication of RD Recs: care plan    1. Pneumonia of both lungs due to infectious organism, unspecified part of lung    2. SOB (shortness of breath)    3. Acute on chronic congestive heart failure, unspecified congestive heart failure type    4. Acute respiratory failure with hypoxia and hypercarbia    5. Aspiration pneumonia of both lower lobes, unspecified aspiration pneumonia type    6. Hypothyroidism, unspecified type      Past Medical History:   Diagnosis Date    Arthritis     Cancer     Coronary artery disease     Dementia     Fractures     lt humerus    Hypertension     Schizophrenia     Thyroid disease        Reason for Assessment  Reason for Assessment: identified at risk by screening criteria  Interdisciplinary Rounds: attended  General Information Comments: Admits with worsening SOB/dementia. NPO. MD discussing comfort care/hospice with family.      8/8/17 Per report from medical rounds and chart, pt is unresponsive. Family desires comfort care only.  Not appropriate for follow up.     Nutrition Prescription Ordered  Current Diet Order: NPO      Evaluation of Received Nutrients/Fluid Intake  Oral Fluid (mL): 0  % Intake of Estimated Energy Needs: 0 - 25 %  % Meal Intake: NPO     Nutrition Risk  "Screen  Nutrition Risk Screen: dysphagia or difficulty swallowing    Nutrition/Diet History  Food Preferences: no cultural or religioud food preferences identified. NKFA.   Factors Affecting Nutritional Intake: NPO, difficulty/impaired swallowing, impaired cognitive status/motor control    Labs/Tests/Procedures/Meds  Diagnostic Test/Procedure Review: reviewed, pertinent  Pertinent Labs Reviewed: reviewed, pertinent  BMP  Lab Results   Component Value Date     2017    K 3.2 (L) 2017    CL 92 (L) 2017    CO2 40 (H) 2017    BUN 23 2017    CREATININE 1.1 2017    CALCIUM 9.3 2017    ANIONGAP 10 2017    ESTGFRAFRICA 56 (A) 2017    EGFRNONAA 49 (A) 2017     Lab Results   Component Value Date    ALBUMIN 2.1 (L) 2017     Lab Results   Component Value Date    CALCIUM 9.3 2017     No results for input(s): POCTGLUCOSE in the last 24 hours.    Pertinent Medications Reviewed: reviewed   Scheduled Meds:   scopolamine  1 patch Transdermal Q3 Days         Physical Findings  Overall Physical Appearance:  (noah, per RN not appropriate for RD visit at this time)  Oral/Mouth Cavity: tooth/teeth missing  Skin: edema (Mane score 14)    Anthropometrics  Temp: 97.6 °F (36.4 °C)  Height: 5' 4"  Weight Method: Stated  Weight: 65.9 kg (145 lb 4.5 oz)  Ideal Body Weight (IBW), Female: 120 lb  % Ideal Body Weight, Female (lb): 137.43 lb  BMI (Calculated): 28.4  BMI Grade: 25 - 29.9 - overweight  Usual Body Weight (UBW), k.8 kg (per chart review, 2017)  % Usual Body Weight: 97.6  % Weight Change From Usual Weight: 2.4 %       Estimated/Assessed Needs  Weight Used For Calorie Calculations: 74.8 kg (164 lb 14.5 oz)   Energy Need Method: [place]-St Johnor  RMR ([place]-StMikki Jeor Equation): 1223 x1.2= 1467 kcals/day  Weight Used For Protein Calculations: 74.8 kg (164 lb 14.5 oz)  0.8 gm Protein (gm): 59.97 and 1.0 gm Protein (gm): 74.96  Fluid Need Method: RDA " Method        Assessment and Plan  Nutrition Problem  inadequate oral intake    Related to (etiology):   Decreased ability to consume sufficient energy    Signs and Symptoms (as evidenced by):   NPO status    Interventions/Recommendations (treatment strategy):  Advance diet per SLP/MD.     Nutrition Diagnosis Status:   New          Monitor and Evaluation  Food and Nutrient Intake: energy intake, food and beverage intake, enteral nutrition intake  Food and Nutrient Adminstration: diet order, enteral and parenteral nutrition administration  Anthropometric Measurements: weight, weight change, body mass index  Biochemical Data, Medical Tests and Procedures: electrolyte and renal panel, glucose/endocrine profile, lipid profile  Nutrition-Focused Physical Findings: overall appearance    Nutrition Risk  Level of Risk:  (x1 weekly)    Nutrition Follow-Up  RD Follow-up?: Yes     Discharge Planning: unable to determine at this time

## 2017-08-08 NOTE — PLAN OF CARE
Patient is comfort measures.     08/08/17 1423   Patient Assessment/Suction   Level of Consciousness (AVPU) unresponsive   PRE-TX-O2-ETCO2   O2 Device (Oxygen Therapy) nasal cannula w/ humidification   $ Is the patient on Oxygen? Yes   Flow (L/min) 15   SpO2 (!) 87 %   Pulse Oximetry Type Intermittent   $ Pulse Oximetry - Multiple Charge Pulse Oximetry - Multiple   Aerosol Therapy   $ Aerosol Therapy Charges PRN treatment not required

## 2017-08-09 NOTE — PLAN OF CARE
08/08/17 2012   Patient Assessment/Suction   Level of Consciousness (AVPU) unresponsive   All Lung Fields Breath Sounds coarse   PRE-TX-O2-ETCO2   O2 Device (Oxygen Therapy) High Flow nasal Cannula   $ Is the patient on Oxygen? Yes   Flow (L/min) 15   SpO2 (!) 79 %   Pulse 95   Resp 14   Aerosol Therapy   $ Aerosol Therapy Charges PRN treatment not required;Refused  (family does not want neb treatments given)

## 2017-08-09 NOTE — DISCHARGE SUMMARY
Discharge Summary  Hospital Medicine    Admit Date: 8/3/2017    Date and Time: 201712:50 PM    Discharge Attending Physician: Ara Wilkinson MD    Primary Care Physician: Freddie Johnson MD    Diagnoses:  Active Hospital Problems    Diagnosis  POA    *Acute respiratory failure with hypoxia and hypercarbia [J96.01, J96.02]  Yes    Pneumonia of both lungs due to infectious organism [J18.9]  Yes    Comfort measures only status [Z51.5]  Not Applicable    Aspiration pneumonia of both lower lobes [J69.0]  Yes    Hypothyroidism [E03.9]  Yes    Dementia without behavioral disturbance [F03.90]  Yes    Essential hypertension [I10]  Yes    Acute diastolic heart failure [I50.31]  Yes      Resolved Hospital Problems    Diagnosis Date Resolved POA   No resolved problems to display.     Discharged Condition:     Hospital Course:   Patient was a 77 y.o. female admitted to Hospitalist Service from [facility] in ICU with complaint of worsening SOB. Patient reportedly had past medical history significant for dementia, Schizophrenia, hypertension, hypothyroidism, CAD and unspecified cancer history. Patient was recently hospitalized from 17 to 17 related to aspiration pneumonia + CHF exacerbation.  Patient was discharged to nursing home with hospice. Reportedly, patient signed a waiver at NH to feed the patient and upon worsening is oxygen sats, revoked hospice but continuing with DNR. Patient was unable to provide history. On EMS arrival, the pt was in respiratory distress and oxygen saturation was 94%. She was given albuterol breathing treatment and solumedrol en route. Patient was admitted to Hospitalist medicine service. Patient was managed in ICU for respiratory failure required use of BiPAP. Patient was being treated with IV antibiotics for aspiration pneumonia. Later patient's family requested comfort care but did not agree for inpatient hospice. Patient was pronounced  dead on 12:25 PM.      Consults: None    Significant Diagnostic Studies:     Microbiology Results (last 7 days)     Procedure Component Value Units Date/Time    Blood culture #2 **CANNOT BE ORDERED STAT** [[id number]] Collected:  17 1307    Order Status:  Completed Specimen:  Blood from Antecubital, Left  Arm Updated:  17 2212     Blood Culture, Routine No growth after 5 days.    Blood culture #1 **CANNOT BE ORDERED STAT** [[id number]] Collected:  17 1308    Order Status:  Completed Specimen:  Blood from Antecubital, Right  Arm Updated:  17 2212     Blood Culture, Routine No growth after 5 days.    Urine culture **CANNOT BE ORDERED STAT** [[id number]]  (Susceptibility) Collected:  17 1311    Order Status:  Completed Specimen:  Urine from Urine, Catheterized Updated:  17 1115     Urine Culture, Routine --     ESCHERICHIA COLI ESBL  >100,000 cfu/ml          Special Treatments/Procedures: None  Disposition:

## 2017-08-09 NOTE — TREATMENT PLAN
Per niece's request performed VS.  Pt. Is apneic sats 73%.  Family does not want pt. Turned.  Refused IV Morphine. States pt. Thrashes in bed after admin.  Requests Ativan IV PRN.  Pt. Appears comfortable/no distress noted at this time.  Spoke with family at length regarding dying process.  Gave family handout originally from hospice for this hospital.

## 2017-08-09 NOTE — TREATMENT PLAN
Called sister to update on pt's condition.   Sats decreased to 82%.   BP decreased to 68/31.  Suction apparatus with monet set up   For PRN use.

## 2017-08-09 NOTE — PLAN OF CARE
Problem: Patient Care Overview  Goal: Individualization & Mutuality  Outcome: Ongoing (interventions implemented as appropriate)  Maintaining contact isolation measures. Family at bedside.  Pt with apneic breathing -monitoring closely.  Family refuses turning of pt.  Admin. Ativan IV PRN.  Family (melanie) refuses Morphine IV.

## 2017-08-09 NOTE — PROGRESS NOTES
Called to pronounce patient dead. Patient unresponsive to physical and verbal stimulation. No heart tones, no spontaneous respirations, pupils fixed and dilated  Patient pronounced dead at 12:25 PM. Family at bedside.

## 2017-08-09 NOTE — NURSING
IV removed, body bag and tag and security notified to retrieve body to bring to [facility] [place] [place] per charge nurse jack

## 2017-08-09 NOTE — PLAN OF CARE
"Problem: Patient Care Overview  Goal: Plan of Care Review  Outcome: Unable to achieve outcome(s) by discharge  Unable to achieve outcomes by d/c due to unable to obtain vital signs at approximately 1230, pt has been on comfort measures only due to end of life transitioning, family at bedside, dr conn made aware, allyson and  notified, pt was maintain on contact isolation for ESBL in urine, pt was unresponsive to stimuli, family refused q2hr turning and repositioning after extensive teaching performed, family states "i don't want her to be disturbed," BP has been low up until pt , 02 15L NC and telemetry  removed, family remains in room awaiting other family members      "

## 2017-08-10 NOTE — PLAN OF CARE
08/10/17 1423   Final Note   Assessment Type Final Discharge Note   Discharge Disposition    Discharge planning education complete? Yes

## 2017-10-30 PROBLEM — J81.0 ACUTE PULMONARY EDEMA: Status: RESOLVED | Noted: 2017-01-01 | Resolved: 2017-10-30

## 2019-02-04 NOTE — PHYSICIAN QUERY
PT Name: Pham Weinberg  MR #: [id number]     Physician Query Form - Documentation Clarification      CDS/: Zuleyma Douglas RN              Contact information:  [phone]    This form is a permanent document in the medical record.     Query Date: August 8, 2017    By submitting this query, we are merely seeking further clarification of documentation. Please utilize your independent clinical judgment when addressing the question(s) below.    The Medical record reflects the following:    Supporting Clinical Findings Location in Medical Record   Urine cx  E coli > 100,000cfu/ml    Urine Microscopic:  Bacteria:   Many     IV Cipro 400mg  IV     Vanc 1gm            Lab results 8/3    Lab results 8/3    MAR  8/3-8/6    MAR 8/3-8/7                                                                                      Doctor, Please specify diagnosis or diagnoses associated with above clinical findings.    Provider Use Only    [  ]  UTI     [ X ]  UTI with E coli  [  ]  Other                                                                                                                           [  ] Clinically undetermined             no

## 2021-07-31 NOTE — PROGRESS NOTES
PT EVALUATION       07/31/21 0845   PT Last Visit   PT Visit Date 07/31/21   Note Type   Note type Evaluation   Pain Assessment   Pain Assessment Tool Pain Assessment not indicated - pt denies pain   Home Living   Type of [address] One level;Stairs to enter with rails  (4 JAMES)   Home Equipment   (RW for inside; Rollator for outside)   Prior Function   Level of [place] Independent with ADLs and functional mobility   Lives With Spouse   Receives Help From Family   ADL Assistance Independent   IADLs Needs assistance   Comments Pt ambulates with a RW inside in uses a Rollator outside  Pt does not drive   Restrictions/Precautions   Other Precautions Fall Risk;Bed Alarm; Chair Alarm   General   Additional Pertinent History Pt admitted with hypoglycemia, weak and dizzy, minimally responsive with blood sugar of 40   Cognition   Overall Cognitive Status WFL   Arousal/Participation Cooperative   Orientation Level Oriented X4   Following Commands Follows all commands and directions without difficulty   RLE Assessment   RLE Assessment WFL  (3+ to 4-/5)   LLE Assessment   LLE Assessment WFL  (3+ to 4-/5)   Bed Mobility   Supine to Sit 5  Supervision   Additional items Verbal cues; Increased time required   Transfers   Sit to Stand 4  Minimal assistance   Additional items Assist x 1;Verbal cues  (Bed raised)   Stand to Sit 4  Minimal assistance   Additional items Assist x 1;Verbal cues   Ambulation/Elevation   Gait pattern Short stride; Foward flexed   Gait Assistance 4  Minimal assist   Additional items Assist x 1;Verbal cues; Tactile cues   Assistive Device Rolling walker   Distance 20 ft with change in direction; pt remained out of bed in chair with all needs in reach and a chair alarm   Balance   Static Sitting Fair +   Static Standing Fair   Dynamic Standing Fair -   Ambulatory Fair -   Activity Tolerance   Activity Tolerance Patient limited by fatigue   Assessment   Problem List Decreased Progress Note  Hospital Medicine  Patient Name:Pham Weinberg  MRN:  [id number]  Patient Class: IP- Inpatient  Admit Date: 7/26/2017  Length of Stay: 2 days  Expected Discharge Date:   Attending Physician: Ara Wilkinson MD  Primary Care Provider:  Freddie Johnson MD    SUBJECTIVE:     Principal Problem: Acute respiratory failure with hypoxia and hypercarbia  Initial history of present illness: Patient is a 77 y.o. female admitted to Hospitalist Service from [facility] in ICU with complaint of altered mental status. Patient reportedly has past medical history significant for dementia, Schizophrenia, hypertension, hypothyroidism, CAD and unspecified cancer history. Per nursing home papers, patient is DNR. Patient is a resident of [facility]. Patient presented with EMS with complaint of altered mental status and SOB. Patient is not able to communicate. No family present at this time. Reportedly, patient's oxygen sats were in 50s. Reportedly, patient had been confused at nursing home for several days and she was accepted a a local [facility] facility for management but noted to have low sats today. Presently, patient is minimally responsive and have been placed on BiPAP and is going to ICU for further management. Patient was last month hospitalized for pneumonia.    PMH/PSH/SH/FH/Meds: reviewed.    Symptoms/Review of Systems: Patient is still requiring BiPAP. Patient received another 2 units of pRBC. No chest pain or headache, fever or abdominal pain.     Diet:  Dental soft  Activity level: Up with assistance  Pain:  Patient reports no pain.       OBJECTIVE:   Vital Signs (Most Recent):      Temp: 98.8 °F (37.1 °C) (07/28/17 0745)  Pulse: 99 (07/28/17 0811)  Resp: 20 (07/28/17 0811)  BP: (!) 169/72 (07/28/17 0700)  SpO2: 96 % (07/28/17 0811)       Vital Signs Range (Last 24H):  Temp:  [97.9 °F (36.6 °C)-99.4 °F (37.4 °C)]   Pulse:  []   Resp:  [14-49]   BP: (114-201)/(55-95)   SpO2:  [81  %-100 %]     Weight: 74.9 kg (165 lb 2 oz)  Body mass index is 28.34 kg/m².    Intake/Output Summary (Last 24 hours) at 07/28/17 0856  Last data filed at 07/28/17 0500   Gross per 24 hour   Intake              156 ml   Output             2500 ml   Net            -2344 ml     Physical Examination:  General appearance: well developed, appears stated age  Head: normocephalic, atraumatic  Eyes:  conjunctivae/corneas clear. PERRL.  Nose: Nares normal. Septum midline.  Throat: lips, mucosa, and tongue normal; teeth and gums normal, no throat erythema.  Neck: supple, symmetrical, trachea midline, + JVD and thyroid not enlarged, symmetric, no tenderness/mass/nodules  Lungs: Fine bi-basal rales  Chest wall: no tenderness  Heart: regular rate and rhythm, S1, S2 normal, no murmur, click, rub or gallop  Abdomen: soft, non-tender non-distented; bowel sounds normal; no masses,  no organomegaly  Extremities: no cyanosis, clubbing, 1+ edema.   Pulses: 2+ and symmetric  Skin: Skin color, texture, turgor normal. No rashes or lesions.  Lymph nodes: Cervical, supraclavicular, and axillary nodes normal.  Neurologic: Grossly non-focal, lethargic.    CBC:    Recent Labs  Lab 07/26/17  1432 07/27/17  0646 07/28/17  0443   WBC 9.40 6.70 13.90*   RBC 2.13* 2.67* 3.65*   HGB 6.3* 7.9* 10.7*   HCT 19.8* 23.4* 32.0*   * 159 169   MCV 93 88 88   MCH 29.6 29.8 29.5   MCHC 31.8* 34.0 33.5   BMP    Recent Labs  Lab 07/27/17  0646 07/27/17  1931 07/28/17  0443    123* 121*    140 143   K 3.3* 3.6 3.4*   CL 99 98 98   CO2 31* 31* 36*   BUN 18 17 17   CREATININE 0.9 0.9 0.9   CALCIUM 8.1* 8.5* 8.6*      Diagnostic Results:  Microbiology Results (last 7 days)     ** No results found for the last 168 hours. **         Chest X-Ray: Moderate cardiomegaly increased from prior study.  Prominence of the pulmonary vasculature and faint infiltrates at the lung bases may represent pulmonary edema.     CT head: Negative Head CT.     ECHO  strength;Decreased range of motion;Decreased endurance; Impaired balance;Decreased mobility; Decreased coordination;Decreased safety awareness   Assessment Patient seen for Physical Therapy evaluation  Patient admitted with Hypoglycemia  Comorbidities affecting patient's physical performance include:  CAD, CKD 2, dm 2, hypertension, anemia, prostate cancer, hyponatremia, arthropathy, BLE weakness  Personal factors affecting patient at time of initial evaluation include: lives in one story house, stairs to enter home, inability to navigate community distances, inability to navigate level surfaces without external assistance and inability to perform dynamic tasks in community  Prior to admission, patient was independent with functional mobility with RW or rollator, independent with ADLS, requiring assist for IADLS, living with spouse in a one level home with 4 steps to enter and ambulating household distance  Please find objective findings from Physical Therapy assessment regarding body systems outlined above with impairments and limitations including weakness, decreased ROM, impaired balance, decreased endurance, impaired coordination, gait deviations, decreased activity tolerance, decreased functional mobility tolerance, decreased safety awareness and fall risk  The Barthel Index was used as a functional outcome tool presenting with a score of 55 today indicating marked limitations of functional mobility and ADLS  Patient's clinical presentation is currently unstable/unpredictable as seen in patient's presentation of vital sign response, changing level of pain, increased fall risk, new onset of impairment of functional mobility, decreased endurance and new onset of weakness  Pt would benefit from continued Physical Therapy treatment to address deficits as defined above and maximize level of functional mobility   As demonstrated by objective findings, the assigned level of complexity for this evaluation is (06-23-17):    1 - Eccentric hypertrophy.     2 - No wall motion abnormalities.     3 - Hyperdynamic left ventricular systolic function (EF 65-70%).     4 - Normal left ventricular diastolic function.     5 - Right ventricular enlargement with hyperdynamic systolic function.     6 - The estimated PA systolic pressure is greater than 26 mmHg.     7 - Difficult windows.     Assessment/Plan:      *Acute respiratory failure with hypoxia and hypercarbia [J96.01, J96.02]  Use supplemental oxygen to maintain PO2 above 92%.  Use BiPAP as needed. Follow pulmonary recommendations.   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  Neurochecks.  Hopefully with correction of hypoxia and hypercarbia, mental will improve.  avoid psychotropic medications.     Yes    Schizophrenia [F20.9]  Closely monitor mental status.  Psychitric medications are on for now.      Yes    Hypothyroidism [E03.9]  Chronic problem. Will continue chronic medications and monitor for any changes, adjusting as needed.     Yes    Acute diastolic heart failure [I50.31]  Supplemental O2 via nasal canula; titrate O2 saturation to >92%.  Lasix 40 mg IV q 12 hrs. Monitor electrolytes for hypokalemia and hypomagnesemia. Follow cardiology recommendations.  Daily weights.  Strict I/Os.  Fluid restriction.  Na restriction <2g/d.  Follow CXR.     Yes    Type 2 diabetes mellitus with ophthalmic complication, without long-term current use of insulin [E11.39]  Check blood glucose level q 6.  Use Novolog Insulin Sliding Scale as needed.   Continue American Diabetic Association 1800 Kcal diet.     Yes    Dementia without behavioral disturbance [F03.90]  Dementia is controlled currently. Continue home dementia meds and non-pharmacologic interventions to prevent delirium (No VS between 11PM-5AM, activity during day, opening blinds, providing glasses/hearing aids, and up in chair during daytime). Use PRN  high     The patient's AM-PAC Basic Mobility Inpatient Short Form Raw Score is 18, Standardized Score is 41 05  A standardized score less than 42 9 suggests the patient may benefit from discharge to post-acute rehabilitation services  Please also refer to the recommendation of the Physical Therapist for safe discharge planning  Despite ampac score, pt is safe for discharge home with assist of wife, home PT and continued ambulation with a RW  Goals   Patient Goals Go home today   STG Expiration Date 08/07/21   Short Term Goal #1 Bed mobility-I; transfers-S; balance with a RW will be F/F+ for safe gait and mobility and to decrease fall risk   Short Term Goal #2 Pt will ambulate with a RW functional household distances-S; up/down 4 steps with a railing so pt can enter/exit his home-Min assist in order to meet his goal of going home   LTG Expiration Date 08/14/21   Long Term Goal #1 Transfers-I; pt will ambulate with a RW functional household distances-I   Long Term Goal #2 Balance with a RW will be F+/good for safe gait and mobility and to decrease fall risk; up/down 4 steps with a railing so pt can enter/exit his home-S; strength lower extremities 4-to 4/5   Plan   Treatment/Interventions ADL retraining;Functional transfer training;LE strengthening/ROM; Elevations; Therapeutic exercise; Endurance training;Patient/family training;Equipment eval/education; Bed mobility;Gait training; Compensatory technique education   PT Frequency 5x/wk   Recommendation   PT Discharge Recommendation Post Acute Rehab services  Pending pt's progress with PT and LOS pt may be able to go home with home PT     Equipment Recommended   NA   AM-PAC Basic Mobility Inpatient   Turning in Bed Without Bedrails 3   Lying on Back to Sitting on Edge of Flat Bed 3   Moving Bed to Chair 3   Standing Up From Chair 3   Walk in Room 3   Climb 3-5 Stairs 3   Basic Mobility Inpatient Raw Score 18   Basic Mobility Standardized Score 41 05   Barthel Index Feeding 10   Bathing 0   Grooming Score 0   Dressing Score 5   Bladder Score 10   Bowels Score 10   Toilet Use Score 5   Transfers (Bed/Chair) Score 10   Mobility (Level Surface) Score 0   Stairs Score 5   Barthel Index Score 54   Licensure   [place] License Number  Lera Dancer PT [id number]       Time [place]:8431  Time [place]:1861  Total Time: 10 mins      S:  It feels good to get up and walk  O:  Pt transfers sit to stand with minimal assistance and ambulates with a RW 70 ft with change in direction with Min assist/S  Pt transfer stand to sit with Min assist/S  Pt remained out of bed in chair with all needs in reach and a chair alarm, RN aware  A:  Pt is noted his a short stride and forward flexion during ambulation with a RW  Verbal/tactile cues given for pt to increase his stride and upright posturing  Pt will benefit from continued skilled physical therapy services to further improve his gait with a RW, functional mobility, posture, strength, endurance and balance  P:  Continue per PT POC  DCP-post acute rehab services  Pending progress with PT and LOS pt may be able to go home with assist of spouse and home PT  Lera Dancer, [place]   [id number]    Portions of the documentation may have been created using voice recognition software  Occasional wrong word or sound alike substitutions may have occurred due to the inherent limitation of the voice recognition software  Read the chart carefully and recognize, using context, where substitutions have occurred  anti-psychotics to prevent behavior of self harm during sundowning, and avoid narcotics and benzos unless absolutely necessary. PRN anti-psychotics prescribed to avoid self harm behaviors.     Yes    Symptomatic anemia - Iron deficiency type[D64.9] s/p 2 PRBC  Would need PO iron supplementation upon DC.  Transfuse another 2 units follows by IV lasix.  Follow CBC.      Yes       Code status : DNR.  Discussed with patient's sister (next of kin); up date provided, she is aware of critical condition and reported she would not like patient to be miserable if her condition does not improve. She is thinking about hospice/comfort care. She will be in touch with us.     VTE Risk Mitigation         Ordered     enoxaparin injection 40 mg  Daily     Route:  Subcutaneous        07/26/17 1647     Medium Risk of VTE  Once      07/26/17 1647        Ara Wilkinson MD  Department of Hospital Medicine   [facility]